# Patient Record
Sex: FEMALE | Race: WHITE | NOT HISPANIC OR LATINO | Employment: OTHER | URBAN - METROPOLITAN AREA
[De-identification: names, ages, dates, MRNs, and addresses within clinical notes are randomized per-mention and may not be internally consistent; named-entity substitution may affect disease eponyms.]

---

## 2017-04-05 ENCOUNTER — CONVERSION ENCOUNTER (OUTPATIENT)
Dept: GENERAL RADIOLOGY | Facility: HOSPITAL | Age: 72
End: 2017-04-05

## 2018-04-11 ENCOUNTER — OFFICE VISIT CONVERTED (OUTPATIENT)
Dept: SURGERY | Facility: CLINIC | Age: 73
End: 2018-04-11
Attending: SURGERY

## 2018-04-25 ENCOUNTER — CONVERSION ENCOUNTER (OUTPATIENT)
Dept: GENERAL RADIOLOGY | Facility: HOSPITAL | Age: 73
End: 2018-04-25

## 2019-07-11 ENCOUNTER — HOSPITAL ENCOUNTER (OUTPATIENT)
Dept: GENERAL RADIOLOGY | Facility: HOSPITAL | Age: 74
Discharge: HOME | End: 2019-07-11

## 2019-12-04 ENCOUNTER — HOSPITAL ENCOUNTER (OUTPATIENT)
Dept: GENERAL RADIOLOGY | Facility: HOSPITAL | Age: 74
Discharge: HOME | End: 2019-12-04

## 2021-02-24 ENCOUNTER — HOSPITAL ENCOUNTER (OUTPATIENT)
Dept: VACCINE CLINIC | Facility: HOSPITAL | Age: 76
Discharge: HOME | End: 2021-02-24
Attending: INTERNAL MEDICINE

## 2021-03-25 ENCOUNTER — HOSPITAL ENCOUNTER (OUTPATIENT)
Dept: VACCINE CLINIC | Facility: HOSPITAL | Age: 76
Discharge: HOME | End: 2021-03-25
Attending: INTERNAL MEDICINE

## 2021-05-16 VITALS — HEIGHT: 61 IN | WEIGHT: 149.37 LBS | BODY MASS INDEX: 28.2 KG/M2 | RESPIRATION RATE: 16 BRPM

## 2021-05-22 ENCOUNTER — TRANSCRIBE ORDERS (OUTPATIENT)
Dept: ADMINISTRATIVE | Facility: HOSPITAL | Age: 76
End: 2021-05-22

## 2021-05-22 DIAGNOSIS — Z12.31 VISIT FOR SCREENING MAMMOGRAM: Primary | ICD-10-CM

## 2021-06-23 ENCOUNTER — HOSPITAL ENCOUNTER (OUTPATIENT)
Dept: MAMMOGRAPHY | Facility: HOSPITAL | Age: 76
Discharge: HOME | End: 2021-06-23
Admitting: NURSE PRACTITIONER

## 2021-06-23 DIAGNOSIS — Z12.31 VISIT FOR SCREENING MAMMOGRAM: ICD-10-CM

## 2021-06-23 PROCEDURE — 77063 BREAST TOMOSYNTHESIS BI: CPT | Performed by: RADIOLOGY

## 2021-06-23 PROCEDURE — 77067 SCR MAMMO BI INCL CAD: CPT

## 2021-06-23 PROCEDURE — 77063 BREAST TOMOSYNTHESIS BI: CPT

## 2021-06-23 PROCEDURE — 77067 SCR MAMMO BI INCL CAD: CPT | Performed by: RADIOLOGY

## 2021-07-07 ENCOUNTER — TRANSCRIBE ORDERS (OUTPATIENT)
Dept: ADMINISTRATIVE | Facility: HOSPITAL | Age: 76
End: 2021-07-07

## 2021-07-07 DIAGNOSIS — R92.8 ABNORMAL MAMMOGRAM: Primary | ICD-10-CM

## 2021-07-29 ENCOUNTER — TRANSCRIBE ORDERS (OUTPATIENT)
Dept: ADMINISTRATIVE | Facility: HOSPITAL | Age: 76
End: 2021-07-29

## 2021-07-29 ENCOUNTER — HOSPITAL ENCOUNTER (OUTPATIENT)
Dept: ULTRASOUND IMAGING | Facility: HOSPITAL | Age: 76
Discharge: HOME | End: 2021-07-29

## 2021-07-29 ENCOUNTER — HOSPITAL ENCOUNTER (OUTPATIENT)
Dept: MAMMOGRAPHY | Facility: HOSPITAL | Age: 76
Discharge: HOME | End: 2021-07-29

## 2021-07-29 DIAGNOSIS — N63.0 BREAST MASS: ICD-10-CM

## 2021-07-29 DIAGNOSIS — R92.1 BREAST CALCIFICATION SEEN ON MAMMOGRAM: ICD-10-CM

## 2021-07-29 DIAGNOSIS — N63.10 BREAST MASS, RIGHT: Primary | ICD-10-CM

## 2021-07-29 DIAGNOSIS — R92.8 ABNORMAL MAMMOGRAM: ICD-10-CM

## 2021-07-29 PROCEDURE — 76642 ULTRASOUND BREAST LIMITED: CPT

## 2021-07-29 PROCEDURE — 77065 DX MAMMO INCL CAD UNI: CPT

## 2021-07-29 PROCEDURE — G0279 TOMOSYNTHESIS, MAMMO: HCPCS

## 2021-08-09 ENCOUNTER — HOSPITAL ENCOUNTER (OUTPATIENT)
Dept: MAMMOGRAPHY | Facility: HOSPITAL | Age: 76
Discharge: HOME | End: 2021-08-09

## 2021-08-09 DIAGNOSIS — N63.10 BREAST MASS, RIGHT: ICD-10-CM

## 2021-08-09 DIAGNOSIS — R92.1 BREAST CALCIFICATION SEEN ON MAMMOGRAM: ICD-10-CM

## 2021-08-09 PROCEDURE — 19000 PUNCTURE ASPIR CYST BREAST: CPT | Performed by: RADIOLOGY

## 2021-08-09 PROCEDURE — 76098 X-RAY EXAM SURGICAL SPECIMEN: CPT | Performed by: RADIOLOGY

## 2021-08-09 PROCEDURE — 77065 DX MAMMO INCL CAD UNI: CPT | Performed by: RADIOLOGY

## 2021-08-09 PROCEDURE — 76942 ECHO GUIDE FOR BIOPSY: CPT

## 2021-08-09 PROCEDURE — 88305 TISSUE EXAM BY PATHOLOGIST: CPT | Performed by: NURSE PRACTITIONER

## 2021-08-09 PROCEDURE — 25010000003 LIDOCAINE 1 % SOLUTION: Performed by: NURSE PRACTITIONER

## 2021-08-09 PROCEDURE — 76098 X-RAY EXAM SURGICAL SPECIMEN: CPT

## 2021-08-09 PROCEDURE — 19081 BX BREAST 1ST LESION STRTCTC: CPT | Performed by: RADIOLOGY

## 2021-08-09 RX ORDER — LIDOCAINE HYDROCHLORIDE 10 MG/ML
10 INJECTION, SOLUTION INFILTRATION; PERINEURAL ONCE
Status: COMPLETED | OUTPATIENT
Start: 2021-08-09 | End: 2021-08-09

## 2021-08-09 RX ORDER — LIDOCAINE HYDROCHLORIDE AND EPINEPHRINE 10; 10 MG/ML; UG/ML
10 INJECTION, SOLUTION INFILTRATION; PERINEURAL ONCE
Status: COMPLETED | OUTPATIENT
Start: 2021-08-09 | End: 2021-08-09

## 2021-08-09 RX ADMIN — LIDOCAINE HYDROCHLORIDE,EPINEPHRINE BITARTRATE 10 ML: 10; .01 INJECTION, SOLUTION INFILTRATION; PERINEURAL at 13:57

## 2021-08-09 RX ADMIN — LIDOCAINE HYDROCHLORIDE 10 ML: 10 INJECTION, SOLUTION INFILTRATION; PERINEURAL at 13:57

## 2021-08-09 RX ADMIN — LIDOCAINE HYDROCHLORIDE 10 ML: 10 INJECTION, SOLUTION INFILTRATION; PERINEURAL at 14:01

## 2021-08-10 LAB
CYTO UR: NORMAL
LAB AP CASE REPORT: NORMAL
LAB AP CLINICAL INFORMATION: NORMAL
PATH REPORT.FINAL DX SPEC: NORMAL
PATH REPORT.GROSS SPEC: NORMAL

## 2021-08-11 ENCOUNTER — TELEPHONE (OUTPATIENT)
Dept: MAMMOGRAPHY | Facility: HOSPITAL | Age: 76
End: 2021-08-11

## 2021-08-11 NOTE — TELEPHONE ENCOUNTER
PT CALLED AND SAID THAT SHE SAW HER RESULTS ON MY CHART AND DID NOT KNOW WHAT IT MEANS. I EXPLAINED WHAT IT SAID, BUT I ADVISED TO WAIT UNTIL THE RADIOLOGIST PLACES THE ADDENDUM ON THE REPORT AND I EXPLAINED WHAT THAT MEANS AND SHE V/U.

## 2021-09-21 ENCOUNTER — TRANSCRIBE ORDERS (OUTPATIENT)
Dept: ADMINISTRATIVE | Facility: HOSPITAL | Age: 76
End: 2021-09-21

## 2021-09-21 DIAGNOSIS — Z78.0 POST-MENOPAUSAL: Primary | ICD-10-CM

## 2022-01-05 ENCOUNTER — HOSPITAL ENCOUNTER (OUTPATIENT)
Dept: BONE DENSITY | Facility: HOSPITAL | Age: 77
Discharge: HOME | End: 2022-01-05
Admitting: NURSE PRACTITIONER

## 2022-01-05 DIAGNOSIS — Z78.0 POST-MENOPAUSAL: ICD-10-CM

## 2022-01-05 PROCEDURE — 77080 DXA BONE DENSITY AXIAL: CPT

## 2023-08-30 ENCOUNTER — TRANSCRIBE ORDERS (OUTPATIENT)
Dept: ADMINISTRATIVE | Facility: HOSPITAL | Age: 78
End: 2023-08-30
Payer: MEDICARE

## 2023-08-30 DIAGNOSIS — Z12.31 VISIT FOR SCREENING MAMMOGRAM: Primary | ICD-10-CM

## 2023-09-20 ENCOUNTER — OFFICE VISIT (OUTPATIENT)
Dept: PODIATRY | Facility: CLINIC | Age: 78
End: 2023-09-20
Payer: MEDICARE

## 2023-09-20 VITALS
WEIGHT: 149 LBS | SYSTOLIC BLOOD PRESSURE: 129 MMHG | HEART RATE: 65 BPM | HEIGHT: 61 IN | TEMPERATURE: 97.3 F | OXYGEN SATURATION: 98 % | DIASTOLIC BLOOD PRESSURE: 62 MMHG | BODY MASS INDEX: 28.13 KG/M2

## 2023-09-20 DIAGNOSIS — M19.171 POST-TRAUMATIC OSTEOARTHRITIS OF RIGHT ANKLE: Primary | ICD-10-CM

## 2023-09-20 DIAGNOSIS — M76.821 POSTERIOR TIBIAL TENDINITIS OF RIGHT LOWER EXTREMITY: ICD-10-CM

## 2023-09-20 RX ORDER — TRIAMCINOLONE ACETONIDE 40 MG/ML
20 INJECTION, SUSPENSION INTRA-ARTICULAR; INTRAMUSCULAR ONCE
Status: COMPLETED | OUTPATIENT
Start: 2023-09-20 | End: 2023-09-20

## 2023-09-20 RX ORDER — BACLOFEN 10 MG/1
10 TABLET ORAL 3 TIMES DAILY PRN
COMMUNITY
Start: 2023-08-25

## 2023-09-20 RX ORDER — BUPIVACAINE HYDROCHLORIDE 5 MG/ML
2 INJECTION, SOLUTION PERINEURAL ONCE
Status: COMPLETED | OUTPATIENT
Start: 2023-09-20 | End: 2023-09-20

## 2023-09-20 RX ORDER — LEVOTHYROXINE SODIUM 0.05 MG/1
50 TABLET ORAL DAILY
COMMUNITY
Start: 2023-08-25

## 2023-09-20 RX ORDER — ERGOCALCIFEROL 1.25 MG/1
50000 CAPSULE ORAL
COMMUNITY
Start: 2023-08-25

## 2023-09-20 RX ORDER — AMLODIPINE BESYLATE 5 MG/1
5 TABLET ORAL DAILY
COMMUNITY
Start: 2023-08-25

## 2023-09-20 RX ORDER — CETIRIZINE HYDROCHLORIDE 10 MG/1
10 TABLET ORAL DAILY
COMMUNITY
Start: 2023-08-25

## 2023-09-20 RX ORDER — RAMIPRIL 10 MG/1
10 CAPSULE ORAL DAILY
COMMUNITY
Start: 2023-08-25

## 2023-09-20 RX ORDER — BRIMONIDINE TARTRATE 2 MG/ML
SOLUTION/ DROPS OPHTHALMIC
COMMUNITY
Start: 2023-09-18

## 2023-09-20 RX ADMIN — TRIAMCINOLONE ACETONIDE 20 MG: 40 INJECTION, SUSPENSION INTRA-ARTICULAR; INTRAMUSCULAR at 09:23

## 2023-09-20 RX ADMIN — BUPIVACAINE HYDROCHLORIDE 2 ML: 5 INJECTION, SOLUTION PERINEURAL at 09:23

## 2023-09-20 NOTE — PROGRESS NOTES
[facility] - PODIATRY    Today's Date: 09/20/23    Patient Name: Genna Cain  MRN: [id number]  CSN: [id number]  PCP: Bella Weinstein APRN,   Referring Provider: Bella Weinstein APRN    SUBJECTIVE     Chief Complaint   Patient presents with    Right Ankle - Pain, Establish Care     Xray on chart, done on 8/25/23  Previous surgery due to fracture in 2012  Has screws and plate   Pain started 4 months ago, aching, in the past 3 weeks pain had increased and could not walk on it  Saw PCP, was advised to wear a boot, pt wore boot for 4 days but back started hurting so pt stopped wearing boot, feels better   Trying to stay off as much as she can      Right Foot - Establish Care, Pain     HPI: Genna Cain, a 78 y.o.female, presents to clinic.    Patient is a 78-year-old female presenting with right ankle pain.  Patient states she had ORIF on her ankle approximately 15 years ago.  Patient states it is tender when she is on her feet for long periods of time she likes to go hiking and walking.  Patient states it mainly bothers her after these activities at the end of the day.  She states she was in a boot and felt better but it started causing issues in the back.    Past Medical History:   Diagnosis Date    Ankle pain, right     Hypertension     Muscle spasm of back     Osteoporosis     Thyroid disease      Past Surgical History:   Procedure Laterality Date    ANKLE OPEN REDUCTION INTERNAL FIXATION      CHOLECYSTECTOMY      HYSTERECTOMY      US GUIDED CYST ASPIRATION BREAST N/A 08/09/2021     Family History   Family history unknown: Yes     Social History     Socioeconomic History    Marital status:    Tobacco Use    Smoking status: Former     Types: Cigarettes    Smokeless tobacco: Never   Vaping Use    Vaping Use: Never used   Substance and Sexual Activity    Alcohol use: Yes     Comment: once a month    Drug use: Never    Sexual activity: Defer     Allergies   Allergen Reactions    Codeine  Unknown - High Severity    Sulfa Antibiotics Unknown - Low Severity     Current Outpatient Medications   Medication Sig Dispense Refill    amLODIPine (NORVASC) 5 MG tablet Take 1 tablet by mouth Daily.      baclofen (LIORESAL) 10 MG tablet Take 1 tablet by mouth 3 (Three) Times a Day As Needed.      brimonidine (ALPHAGAN) 0.2 % ophthalmic solution       CALCIUM PO Take 600 mg by mouth Daily.      cetirizine (zyrTEC) 10 MG tablet Take 1 tablet by mouth Daily.      levothyroxine (SYNTHROID, LEVOTHROID) 50 MCG tablet Take 1 tablet by mouth Daily.      ramipril (ALTACE) 10 MG capsule Take 1 capsule by mouth Daily.      vitamin D (ERGOCALCIFEROL) 1.25 MG (68604 UT) capsule capsule Take 1 capsule by mouth Every 7 (Seven) Days.       No current facility-administered medications for this visit.     Review of Systems   Musculoskeletal:         Right ankle pain     OBJECTIVE     Vitals:    09/20/23 0749   BP: 129/62   Pulse: 65   Temp: 97.3 °F (36.3 °C)   SpO2: 98%       No results found for: WBC, RBC, HGB, HCT, MCV, MCH, MCHC, RDW, RDWSD, MPV, PLT, NEUTRORELPCT, LYMPHORELPCT, MONORELPCT, EOSRELPCT, BASORELPCT, AUTOIGPER, NEUTROABS, LYMPHSABS, MONOSABS, EOSABS, BASOSABS, AUTOIGNUM, NRBC      No results found for: GLUCOSE, BUN, CREATININE, EGFRIFNONA, EGFRIFAFRI, BCR, K, CO2, CALCIUM, PROTENTOTREF, ALBUMIN, LABIL2, BILIRUBIN, AST, ALT    Patient seen in no apparent distress.      PHYSICAL EXAM:     Foot/Ankle Exam    GENERAL  Appearance:  appears stated age  Orientation:  AAOx3  Affect:  appropriate  Gait:  unimpaired  Assistance:  independent  Right shoe gear: casual shoe  Left shoe gear: casual shoe    VASCULAR     Right Foot Vascularity   Normal vascular exam    Dorsalis pedis:  2+  Posterior tibial:  2+  Skin temperature:  warm  Edema grading:  None  CFT:  < 3 seconds  Pedal hair growth:  Present  Varicosities:  none     Left Foot Vascularity   Normal vascular exam    Dorsalis pedis:  2+  Posterior tibial:  2+  Skin  temperature:  warm  Edema grading:  None  CFT:  < 3 seconds  Pedal hair growth:  Present  Varicosities:  none     NEUROLOGIC     Right Foot Neurologic   Normal sensation    Light touch sensation: normal  Vibratory sensation: normal  Hot/Cold sensation: normal  Protective Sensation using [place]-Yrn Monofilament:   Sites intact: 10  Sites tested: 10     Left Foot Neurologic   Normal sensation    Light touch sensation: normal  Vibratory sensation: normal  Hot/Cold sensation:  normal  Protective Sensation using [place]-Yrn Monofilament:   Sites intact: 10  Sites tested: 10    MUSCLE STRENGTH     Right Foot Muscle Strength   Foot dorsiflexion:  4  Foot plantar flexion:  4  Foot inversion:  4  Foot eversion:  4     Left Foot Muscle Strength   Foot dorsiflexion:  4  Foot plantar flexion:  4  Foot inversion:  4  Foot eversion:  4    RANGE OF MOTION     Right Foot Range of Motion   Foot and ankle ROM within normal limits    Ankle dorsiflexion: decreased  with pain  Ankle plantar flexion: decreased  with pain  Foot inversion:  with pain     Left Foot Range of Motion   Foot and ankle ROM within normal limits      DERMATOLOGIC      Right Foot Dermatologic   Skin  Right foot skin is intact.      Left Foot Dermatologic   Skin  Left foot skin is intact.     RADIOLOGY:        XR Tibia/Fibula 2 Views,  RT    Result Date: 2023  Narrative: REVIEWING YOUR TEST RESULTS IN [facility] IS NOT A SUBSTITUTE FOR DISCUSSING THOSE RESULTS WITH YOUR HEALTH CARE PROVIDER. P[facility]TA[facility] YOUR PROVIDER [facility]IA [facility] TO DISCUSS ANY QUESTIONS OR CONCERNS YOU MAY HAVE REGARDING THESE TEST RESULTS.  RADIOLOGY REPORT FACILITY:  [facility] UNIT/AGE/GENDER: P.HL  OP      AGE:77 Y          SEX:F PATIENT NAME/:  MADAY BERUMEN J    1945 UNIT NUMBER:  [id number] ACCOUNT NUMBER:  [id number] ACCESSION NUMBER:  [id number] EXAMINATION: Two views of the chest DATE:2023  HISTORY:Risk factor for lung cancer. Screening exam. COMPARISON: 2022 FINDINGS: Two views of the chest demonstrate no confluent infiltrates or pleural effusions. The heart size is normal. There is no congestive heart failure. IMPRESSION: No acute disease in the chest. RightTibia and Fibula. DATE: 2023 HISTORY:Right ankle and leg   pain. FINDINGS: 2 views were obtained. There is no acute fracture or dislocation. No radio opaque foreign body is seen. Hardware seen fixing a right ankle fracture. IMPRESSION: No acute findings Exam: 3 views right ankle. DATE: 2023. HISTORY: Old injury with ankle fracture surgery. Right ankle pain.   FINDINGS: 3 views demonstrate screw plate fixation device of the distal fibula with 2 screws fixing a medial malleolar fracture. There is no acute fracture or dislocation and no evidence of hardware failure is seen.   IMPRESSION: Post surgical change with no acute findings. Dictated by: Ermias Solis M.D. Images and Report reviewed and interpreted by: Ermias Solis M.D. <PS><Electronically signed by: Ermias Solis M.D.> 2023 1416 D: 2023 1412 T: 2023 1412    XR Chest 2Vw    Result Date: 2023  Narrative: REVIEWING YOUR TEST RESULTS IN [facility] IS NOT A SUBSTITUTE FOR DISCUSSING THOSE RESULTS WITH YOUR HEALTH CARE PROVIDER. PLEASE CONTACT YOUR PROVIDER VIA [facility] TO DISCUSS ANY QUESTIONS OR CONCERNS YOU MAY HAVE REGARDING THESE TEST RESULTS.  RADIOLOGY REPORT FACILITY:  [facility] UNIT/AGE/GENDER: P.HL  OP      AGE:77 Y          SEX:F PATIENT NAME/:  MADAY BERUMEN J    1945 UNIT NUMBER:  [id number] ACCOUNT NUMBER:  [id number] ACCESSION NUMBER:  [id number] EXAMINATION: Two views of the chest DATE:2023 HISTORY:Risk factor for lung cancer. Screening exam. COMPARISON: 2022 FINDINGS: Two views of the chest demonstrate no confluent infiltrates or pleural  effusions. The heart size is normal. There is no congestive heart failure. IMPRESSION: No acute disease in the chest. RightTibia and Fibula. DATE: 2023 HISTORY:Right ankle and leg   pain. FINDINGS: 2 views were obtained. There is no acute fracture or dislocation. No radio opaque foreign body is seen. Hardware seen fixing a right ankle fracture. IMPRESSION: No acute findings Exam: 3 views right ankle. DATE: 2023. HISTORY: Old injury with ankle fracture surgery. Right ankle pain.   FINDINGS: 3 views demonstrate screw plate fixation device of the distal fibula with 2 screws fixing a medial malleolar fracture. There is no acute fracture or dislocation and no evidence of hardware failure is seen.   IMPRESSION: Post surgical change with no acute findings. Dictated by: Ermias Solis M.D. Images and Report reviewed and interpreted by: Ermias Solis M.D. <PS><Electronically signed by: Ermias Solis M.D.> 2023 1416 D: 2023 1412 T: 2023 1412    XR Ankle Min 3 Vws RT    Result Date: 2023  Narrative: REVIEWING YOUR TEST RESULTS IN [facility] IS NOT A SUBSTITUTE FOR DISCUSSING THOSE RESULTS WITH YOUR HEALTH CARE PROVIDER. PLEASE CONTACT YOUR PROVIDER [facility]IA [facility] TO DISCUSS ANY QUESTIONS OR CONCERNS YOU MAY HAVE REGARDING THESE TEST RESULTS.  RADIOLOGY REPORT FACILITY:  [facility] UNIT/AGE/GENDER: P.HL  OP      AGE:77 Y          SEX:F PATIENT NAME/:  MADAY BERUMEN J    1945 UNIT NUMBER:  [id number] ACCOUNT NUMBER:  [id number] ACCESSION NUMBER:  [id number] EXAMINATION: Two views of the chest DATE:2023 HISTORY:Risk factor for lung cancer. Screening exam. COMPARISON: 2022 FINDINGS: Two views of the chest demonstrate no confluent infiltrates or pleural effusions. The heart size is normal. There is no congestive heart failure. IMPRESSION: No acute disease in the chest. RightTibia and Fibula. DATE: 2023  HISTORY:Right ankle and leg   pain. FINDINGS: 2 views were obtained. There is no acute fracture or dislocation. No radio opaque foreign body is seen. Hardware seen fixing a right ankle fracture. IMPRESSION: No acute findings Exam: 3 views right ankle. DATE: 08/25/2023. HISTORY: Old injury with ankle fracture surgery. Right ankle pain.   FINDINGS: 3 views demonstrate screw plate fixation device of the distal fibula with 2 screws fixing a medial malleolar fracture. There is no acute fracture or dislocation and no evidence of hardware failure is seen.   IMPRESSION: Post surgical change with no acute findings. Dictated by: Ermias Solis M.D. Images and Report reviewed and interpreted by: Ermias Solis M.D. <PS><Electronically signed by: Ermias Solis M.D.> 08/25/2023 1416 D: 08/25/2023 1412 T: 08/25/2023 1412     ASSESSMENT/PLAN     Diagnoses and all orders for this visit:    1. Post-traumatic osteoarthritis of right ankle (Primary)    2. Posterior tibial tendinitis of right lower extremity      Brace dispensed    Injection  Date/Time: 09/20/2023  Performed by: Bob Nelson DPM  Authorized by: Bob Nelson DPM     Consent: Verbal consent obtained. Written consent obtained.  Risks and benefits: risks, benefits and alternatives were discussed  Consent given by: patient  Patient identity confirmed: verbally with patient  Indications: pain relief    Betadine prep x 3 to the planned injection site.    Injection site: Right ankle    Sedation:  Patient sedated: no    Patient position: sitting  Needle size: 27 G  Local anesthetic: 1.0 mL 0.25% Marcaine plain and 1.0 mL triamcinolone.   Outcome: pain improved  Patient tolerance: Patient tolerated the procedure well with no immediate complications      Patient to begin stretching exercises and icing in the evening as tolerated. Discussed compression therapy and resting the extremity.  Anti-inflammatory medication to begin taking if okay by  PCP.    Comprehensive lower extremity examination and evaluation was performed.    Discussed findings and treatment plan including risks, benefits, and treatment options with patient in detail. Patient agreed with treatment plan.    Medications and allergies reviewed.  Reviewed available lab values along with other pertinent labs.  These were discussed with the patient.    An After Visit Summary was printed and given to the patient at discharge, including (if requested) any available informative/educational handouts regarding diagnosis, treatment, or medications. All questions were answered to patient/family satisfaction. Should symptoms fail to improve or worsen they agree to call or return to clinic or to go to the Emergency Department. Discussed the importance of following up with any needed screening tests/labs/specialist appointments and any requested follow-up recommended by me today. Importance of maintaining follow-up discussed and patient accepts that missed appointments can delay diagnosis and potentially lead to worsening of conditions.    Return in about 1 week (around 9/27/2023)., or sooner if acute issues arise.    This document has been electronically signed by Bob Nelson DPM on September 20, 2023 08:39 EDT

## 2023-10-18 ENCOUNTER — OFFICE VISIT (OUTPATIENT)
Dept: PODIATRY | Facility: CLINIC | Age: 78
End: 2023-10-18
Payer: MEDICARE

## 2023-10-18 VITALS
SYSTOLIC BLOOD PRESSURE: 139 MMHG | TEMPERATURE: 97.8 F | BODY MASS INDEX: 30.21 KG/M2 | HEIGHT: 61 IN | OXYGEN SATURATION: 95 % | WEIGHT: 160 LBS | HEART RATE: 77 BPM | DIASTOLIC BLOOD PRESSURE: 83 MMHG

## 2023-10-18 DIAGNOSIS — M19.171 POST-TRAUMATIC OSTEOARTHRITIS OF RIGHT ANKLE: Primary | ICD-10-CM

## 2023-10-18 DIAGNOSIS — M76.821 POSTERIOR TIBIAL TENDINITIS OF RIGHT LOWER EXTREMITY: ICD-10-CM

## 2023-10-19 NOTE — PROGRESS NOTES
[facility] - PODIATRY    Today's Date: 10/19/23    Patient Name: Genna Cain  MRN: [id number]  [facility]: [id number]  PCP: Bella Weinstein APRN,   Referring Provider: No ref. provider found    SUBJECTIVE     Chief Complaint   Patient presents with    Right Ankle - Follow-up, Arthritis     Injection follow up   Feels great, wears ankle when walking a lot      HPI: Genna Cain, a 78 y.o.female, presents to clinic.    Patient is a 78-year-old female presenting with right ankle pain.  Patient states she had ORIF on her ankle approximately 15 years ago.  Patient states it is tender when she is on her feet for long periods of time she likes to go hiking and walking.  Patient states it mainly bothers her after these activities at the end of the day.  She states she was in a boot and felt better but it started causing issues in the back.    10/18/2023-patient is doing great since getting the injection.  No new complaints.  Past Medical History:   Diagnosis Date    Ankle pain, right     Hypertension     Muscle spasm of back     Osteoporosis     Post-traumatic osteoarthritis of right ankle     Posterior tibial tendinitis of right lower extremity     Thyroid disease      Past Surgical History:   Procedure Laterality Date    ANKLE OPEN REDUCTION INTERNAL FIXATION      CHOLECYSTECTOMY      HYSTERECTOMY      US GUIDED CYST ASPIRATION BREAST N/A 08/09/2021     Family History   Family history unknown: Yes     Social History     Socioeconomic History    Marital status:    Tobacco Use    Smoking status: Former     Types: Cigarettes    Smokeless tobacco: Never   Vaping Use    Vaping Use: Never used   Substance and Sexual Activity    Alcohol use: Yes     Comment: once a month    Drug use: Never    Sexual activity: Defer     Allergies   Allergen Reactions    Codeine Unknown - High Severity    Sulfa Antibiotics Unknown - Low Severity     Current Outpatient Medications   Medication Sig Dispense Refill     "amLODIPine (NORVASC) 5 MG tablet Take 1 tablet by mouth Daily.      baclofen (LIORESAL) 10 MG tablet Take 1 tablet by mouth 3 (Three) Times a Day As Needed.      brimonidine (ALPHAGAN) 0.2 % ophthalmic solution       CALCIUM PO Take 600 mg by mouth Daily.      cetirizine (zyrTEC) 10 MG tablet Take 1 tablet by mouth Daily.      levothyroxine (SYNTHROID, LEVOTHROID) 50 MCG tablet Take 1 tablet by mouth Daily.      ramipril (ALTACE) 10 MG capsule Take 1 capsule by mouth Daily.      vitamin D (ERGOCALCIFEROL) 1.25 MG (46975 UT) capsule capsule Take 1 capsule by mouth Every 7 (Seven) Days.       No current facility-administered medications for this visit.     Review of Systems   Musculoskeletal:         Right ankle pain       OBJECTIVE     Vitals:    10/18/23 0945   BP: 139/83   Pulse: 77   Temp: 97.8 °F (36.6 °C)   SpO2: 95%       No results found for: \"WBC\", \"RBC\", \"HGB\", \"HCT\", \"MCV\", \"MCH\", \"MCHC\", \"RDW\", \"RDWSD\", \"MPV\", \"PLT\", \"NEUTRORELPCT\", \"LYMPHORELPCT\", \"MONORELPCT\", \"EOSRELPCT\", \"BASORELPCT\", \"AUTOIGPER\", \"NEUTROABS\", \"LYMPHSABS\", \"MONOSABS\", \"EOSABS\", \"BASOSABS\", \"AUTOIGNUM\", \"NRBC\"      No results found for: \"GLUCOSE\", \"BUN\", \"CREATININE\", \"EGFRIFNONA\", \"EGFRIFAFRI\", \"BCR\", \"K\", \"CO2\", \"CALCIUM\", \"PROTENTOTREF\", \"ALBUMIN\", \"LABIL2\", \"BILIRUBIN\", \"AST\", \"ALT\"    Patient seen in no apparent distress.      PHYSICAL EXAM:     Foot/Ankle Exam    GENERAL  Appearance:  appears stated age  Orientation:  AAOx3  Affect:  appropriate  Gait:  unimpaired  Assistance:  independent  Right shoe gear: casual shoe  Left shoe gear: casual shoe    VASCULAR     Right Foot Vascularity   Normal vascular exam    Dorsalis pedis:  2+  Posterior tibial:  2+  Skin temperature:  warm  Edema grading:  None  CFT:  < 3 seconds  Pedal hair growth:  Present  Varicosities:  none     Left Foot Vascularity   Normal vascular exam    Dorsalis pedis:  2+  Posterior tibial:  2+  Skin temperature:  warm  Edema grading:  None  CFT:  < 3 " seconds  Pedal hair growth:  Present  Varicosities:  none     NEUROLOGIC     Right Foot Neurologic   Normal sensation    Light touch sensation: normal  Vibratory sensation: normal  Hot/Cold sensation: normal  Protective Sensation using [place]-Yrn Monofilament:   Sites intact: 10  Sites tested: 10     Left Foot Neurologic   Normal sensation    Light touch sensation: normal  Vibratory sensation: normal  Hot/Cold sensation:  normal  Protective Sensation using [place]-Yrn Monofilament:   Sites intact: 10  Sites tested: 10    MUSCLE STRENGTH     Right Foot Muscle Strength   Foot dorsiflexion:  4  Foot plantar flexion:  4  Foot inversion:  4  Foot eversion:  4     Left Foot Muscle Strength   Foot dorsiflexion:  4  Foot plantar flexion:  4  Foot inversion:  4  Foot eversion:  4    RANGE OF MOTION     Right Foot Range of Motion   Foot and ankle ROM within normal limits    Ankle dorsiflexion: decreased  with pain  Ankle plantar flexion: decreased  with pain  Foot inversion:  with pain     Left Foot Range of Motion   Foot and ankle ROM within normal limits      DERMATOLOGIC      Right Foot Dermatologic   Skin  Right foot skin is intact.      Left Foot Dermatologic   Skin  Left foot skin is intact.       RADIOLOGY:        XR Ankle 3+ View Right    Result Date: 9/21/2023  Narrative: IN-OFFICE IMAGING:   Standing, weightbearing, 3 view, AP, MO, Lateral, right ankle Indication: Pain Findings: 3 views of the right ankle demonstrate no acute fracture dislocations.  Hardware intact to the fibula and medial malleolus.  No signs of loosening of the hardware.  Normal bone density.  Mild degenerative changes tibiotalar joint. Comparison: No     ASSESSMENT/PLAN     Diagnoses and all orders for this visit:    1. Post-traumatic osteoarthritis of right ankle (Primary)    2. Posterior tibial tendinitis of right lower extremity        Patient to begin continue Exercises and icing in the evening as tolerated. Discussed compression  therapy and resting the extremity.  Anti-inflammatory medication to begin taking if okay by PCP.    Return to clinic as needed    Comprehensive lower extremity examination and evaluation was performed.    Discussed findings and treatment plan including risks, benefits, and treatment options with patient in detail. Patient agreed with treatment plan.    Medications and allergies reviewed.  Reviewed available lab values along with other pertinent labs.  These were discussed with the patient.    An After Visit Summary was printed and given to the patient at discharge, including (if requested) any available informative/educational handouts regarding diagnosis, treatment, or medications. All questions were answered to patient/family satisfaction. Should symptoms fail to improve or worsen they agree to call or return to clinic or to go to the Emergency Department. Discussed the importance of following up with any needed screening tests/labs/specialist appointments and any requested follow-up recommended by me today. Importance of maintaining follow-up discussed and patient accepts that missed appointments can delay diagnosis and potentially lead to worsening of conditions.    No follow-ups on file., or sooner if acute issues arise.    This document has been electronically signed by Bob Nelson DPM on October 19, 2023 10:59 EDT

## 2023-12-13 ENCOUNTER — HOSPITAL ENCOUNTER (OUTPATIENT)
Dept: MAMMOGRAPHY | Facility: HOSPITAL | Age: 78
Discharge: HOME | End: 2023-12-13
Admitting: NURSE PRACTITIONER
Payer: MEDICARE

## 2023-12-13 DIAGNOSIS — Z12.31 VISIT FOR SCREENING MAMMOGRAM: ICD-10-CM

## 2023-12-13 PROCEDURE — 77063 BREAST TOMOSYNTHESIS BI: CPT

## 2023-12-13 PROCEDURE — 77067 SCR MAMMO BI INCL CAD: CPT

## 2024-10-28 ENCOUNTER — APPOINTMENT (OUTPATIENT)
Dept: CT IMAGING | Facility: HOSPITAL | Age: 79
End: 2024-10-28
Payer: MEDICARE

## 2024-10-28 ENCOUNTER — APPOINTMENT (OUTPATIENT)
Dept: GENERAL RADIOLOGY | Facility: HOSPITAL | Age: 79
End: 2024-10-28
Payer: MEDICARE

## 2024-10-28 ENCOUNTER — HOSPITAL ENCOUNTER (EMERGENCY)
Facility: HOSPITAL | Age: 79
Discharge: HOME | End: 2024-10-28
Attending: EMERGENCY MEDICINE | Admitting: EMERGENCY MEDICINE
Payer: MEDICARE

## 2024-10-28 VITALS
BODY MASS INDEX: 28.67 KG/M2 | HEART RATE: 68 BPM | OXYGEN SATURATION: 92 % | HEIGHT: 63 IN | RESPIRATION RATE: 17 BRPM | DIASTOLIC BLOOD PRESSURE: 72 MMHG | SYSTOLIC BLOOD PRESSURE: 120 MMHG | WEIGHT: 161.82 LBS | TEMPERATURE: 97.9 F

## 2024-10-28 DIAGNOSIS — R07.89 CHEST PRESSURE: ICD-10-CM

## 2024-10-28 DIAGNOSIS — R42 LIGHTHEADEDNESS: Primary | ICD-10-CM

## 2024-10-28 LAB
ALBUMIN SERPL-MCNC: 4.1 G/DL (ref 3.5–5.2)
ALBUMIN/GLOB SERPL: 1.1 G/DL
ALP SERPL-CCNC: 78 U/L (ref 39–117)
ALT SERPL W P-5'-P-CCNC: 12 U/L (ref 1–33)
ANION GAP SERPL CALCULATED.3IONS-SCNC: 11.8 MMOL/L (ref 5–15)
AST SERPL-CCNC: 17 U/L (ref 1–32)
BASOPHILS # BLD AUTO: 0.05 10*3/MM3 (ref 0–0.2)
BASOPHILS NFR BLD AUTO: 0.7 % (ref 0–1.5)
BILIRUB SERPL-MCNC: 0.4 MG/DL (ref 0–1.2)
BUN SERPL-MCNC: 16 MG/DL (ref 8–23)
BUN/CREAT SERPL: 21.9 (ref 7–25)
CALCIUM SPEC-SCNC: 9.3 MG/DL (ref 8.6–10.5)
CHLORIDE SERPL-SCNC: 101 MMOL/L (ref 98–107)
CO2 SERPL-SCNC: 26.2 MMOL/L (ref 22–29)
CREAT SERPL-MCNC: 0.73 MG/DL (ref 0.57–1)
DEPRECATED RDW RBC AUTO: 43.7 FL (ref 37–54)
EGFRCR SERPLBLD CKD-EPI 2021: 84.8 ML/MIN/1.73
EOSINOPHIL # BLD AUTO: 0.11 10*3/MM3 (ref 0–0.4)
EOSINOPHIL NFR BLD AUTO: 1.6 % (ref 0.3–6.2)
ERYTHROCYTE [DISTWIDTH] IN BLOOD BY AUTOMATED COUNT: 12.7 % (ref 12.3–15.4)
GEN 5 2HR TROPONIN T REFLEX: <6 NG/L
GLOBULIN UR ELPH-MCNC: 3.6 GM/DL
GLUCOSE SERPL-MCNC: 99 MG/DL (ref 65–99)
HCT VFR BLD AUTO: 41.6 % (ref 34–46.6)
HGB BLD-MCNC: 13.4 G/DL (ref 12–15.9)
HOLD SPECIMEN: NORMAL
HOLD SPECIMEN: NORMAL
IMM GRANULOCYTES # BLD AUTO: 0.02 10*3/MM3 (ref 0–0.05)
IMM GRANULOCYTES NFR BLD AUTO: 0.3 % (ref 0–0.5)
LIPASE SERPL-CCNC: 15 U/L (ref 13–60)
LYMPHOCYTES # BLD AUTO: 1.97 10*3/MM3 (ref 0.7–3.1)
LYMPHOCYTES NFR BLD AUTO: 28.3 % (ref 19.6–45.3)
MAGNESIUM SERPL-MCNC: 2.2 MG/DL (ref 1.6–2.4)
MCH RBC QN AUTO: 30.1 PG (ref 26.6–33)
MCHC RBC AUTO-ENTMCNC: 32.2 G/DL (ref 31.5–35.7)
MCV RBC AUTO: 93.5 FL (ref 79–97)
MONOCYTES # BLD AUTO: 0.41 10*3/MM3 (ref 0.1–0.9)
MONOCYTES NFR BLD AUTO: 5.9 % (ref 5–12)
NEUTROPHILS NFR BLD AUTO: 4.41 10*3/MM3 (ref 1.7–7)
NEUTROPHILS NFR BLD AUTO: 63.2 % (ref 42.7–76)
NRBC BLD AUTO-RTO: 0 /100 WBC (ref 0–0.2)
NT-PROBNP SERPL-MCNC: 88.1 PG/ML (ref 0–1800)
PLATELET # BLD AUTO: 361 10*3/MM3 (ref 140–450)
PMV BLD AUTO: 10.2 FL (ref 6–12)
POTASSIUM SERPL-SCNC: 4.1 MMOL/L (ref 3.5–5.2)
PROT SERPL-MCNC: 7.7 G/DL (ref 6–8.5)
RBC # BLD AUTO: 4.45 10*6/MM3 (ref 3.77–5.28)
SODIUM SERPL-SCNC: 139 MMOL/L (ref 136–145)
TROPONIN T DELTA: NORMAL
TROPONIN T SERPL HS-MCNC: <6 NG/L
WBC NRBC COR # BLD AUTO: 6.97 10*3/MM3 (ref 3.4–10.8)
WHOLE BLOOD HOLD COAG: NORMAL
WHOLE BLOOD HOLD SPECIMEN: NORMAL

## 2024-10-28 PROCEDURE — 83880 ASSAY OF NATRIURETIC PEPTIDE: CPT

## 2024-10-28 PROCEDURE — 36415 COLL VENOUS BLD VENIPUNCTURE: CPT

## 2024-10-28 PROCEDURE — 71260 CT THORAX DX C+: CPT

## 2024-10-28 PROCEDURE — 93005 ELECTROCARDIOGRAM TRACING: CPT | Performed by: EMERGENCY MEDICINE

## 2024-10-28 PROCEDURE — 84484 ASSAY OF TROPONIN QUANT: CPT

## 2024-10-28 PROCEDURE — 71045 X-RAY EXAM CHEST 1 VIEW: CPT

## 2024-10-28 PROCEDURE — 85025 COMPLETE CBC W/AUTO DIFF WBC: CPT

## 2024-10-28 PROCEDURE — 80053 COMPREHEN METABOLIC PANEL: CPT

## 2024-10-28 PROCEDURE — 99285 EMERGENCY DEPT VISIT HI MDM: CPT

## 2024-10-28 PROCEDURE — 93005 ELECTROCARDIOGRAM TRACING: CPT

## 2024-10-28 PROCEDURE — 84484 ASSAY OF TROPONIN QUANT: CPT | Performed by: EMERGENCY MEDICINE

## 2024-10-28 PROCEDURE — 83735 ASSAY OF MAGNESIUM: CPT

## 2024-10-28 PROCEDURE — 83690 ASSAY OF LIPASE: CPT

## 2024-10-28 PROCEDURE — 25510000001 IOPAMIDOL PER 1 ML: Performed by: EMERGENCY MEDICINE

## 2024-10-28 RX ORDER — IOPAMIDOL 755 MG/ML
100 INJECTION, SOLUTION INTRAVASCULAR
Status: COMPLETED | OUTPATIENT
Start: 2024-10-28 | End: 2024-10-28

## 2024-10-28 RX ORDER — ASPIRIN 81 MG/1
324 TABLET, CHEWABLE ORAL ONCE
Status: DISCONTINUED | OUTPATIENT
Start: 2024-10-28 | End: 2024-10-28 | Stop reason: HOSPADM

## 2024-10-28 RX ORDER — SODIUM CHLORIDE 0.9 % (FLUSH) 0.9 %
10 SYRINGE (ML) INJECTION AS NEEDED
Status: DISCONTINUED | OUTPATIENT
Start: 2024-10-28 | End: 2024-10-28 | Stop reason: HOSPADM

## 2024-10-28 RX ADMIN — IOPAMIDOL 100 ML: 755 INJECTION, SOLUTION INTRAVENOUS at 14:22

## 2024-10-29 LAB
QT INTERVAL: 395 MS
QT INTERVAL: 421 MS
QTC INTERVAL: 426 MS
QTC INTERVAL: 430 MS

## 2024-11-07 ENCOUNTER — OFFICE VISIT (OUTPATIENT)
Dept: CARDIOLOGY | Facility: CLINIC | Age: 79
End: 2024-11-07
Payer: MEDICARE

## 2024-11-07 VITALS
DIASTOLIC BLOOD PRESSURE: 69 MMHG | SYSTOLIC BLOOD PRESSURE: 135 MMHG | BODY MASS INDEX: 28.53 KG/M2 | HEART RATE: 73 BPM | WEIGHT: 161 LBS | HEIGHT: 63 IN

## 2024-11-07 DIAGNOSIS — R07.9 CHEST PAIN, UNSPECIFIED TYPE: Primary | ICD-10-CM

## 2024-11-07 DIAGNOSIS — R00.2 PALPITATIONS: ICD-10-CM

## 2024-11-07 DIAGNOSIS — I10 ESSENTIAL HYPERTENSION: ICD-10-CM

## 2024-11-07 PROBLEM — M81.0 OSTEOPOROSIS: Status: ACTIVE | Noted: 2024-11-07

## 2024-11-07 PROBLEM — E55.9 VITAMIN D DEFICIENCY: Status: ACTIVE | Noted: 2024-11-07

## 2024-11-07 PROBLEM — E03.9 HYPOTHYROIDISM: Status: ACTIVE | Noted: 2024-11-07

## 2024-11-07 PROCEDURE — 99204 OFFICE O/P NEW MOD 45 MIN: CPT | Performed by: INTERNAL MEDICINE

## 2024-11-07 PROCEDURE — 1159F MED LIST DOCD IN RCRD: CPT | Performed by: INTERNAL MEDICINE

## 2024-11-07 PROCEDURE — 1160F RVW MEDS BY RX/DR IN RCRD: CPT | Performed by: INTERNAL MEDICINE

## 2024-11-07 PROCEDURE — 3075F SYST BP GE 130 - 139MM HG: CPT | Performed by: INTERNAL MEDICINE

## 2024-11-07 PROCEDURE — 3078F DIAST BP <80 MM HG: CPT | Performed by: INTERNAL MEDICINE

## 2024-11-09 PROBLEM — R00.2 PALPITATIONS: Status: ACTIVE | Noted: 2024-11-09

## 2024-11-09 PROBLEM — R07.9 CHEST PAIN: Status: ACTIVE | Noted: 2024-11-09

## 2024-11-09 NOTE — ASSESSMENT & PLAN NOTE
Blood pressure well-controlled in the office today.  Continue amlodipine and ramipril at the current dose.

## 2024-11-09 NOTE — ASSESSMENT & PLAN NOTE
1 episode of chest pressure and shortness of breath while shopping in [employer].  Symptoms eventually subsided.  Symptoms are somewhat atypical for angina.  However she has multiple risk factors of coronary artery disease and no recent ischemic evaluation available.  Will proceed with an echocardiogram to assess LV function and valvular function.  A SPECT stress test were performed to rule out reversible ischemia.

## 2024-11-09 NOTE — ASSESSMENT & PLAN NOTE
She reports remittent palpitations.  Will proceed with a 24-hour Holter monitor study to rule out significant arrhythmias.

## 2024-11-09 NOTE — PROGRESS NOTES
CARDIOLOGY INITIAL CONSULT       Chief Complaint  Establish Care and Chest Pain    Subjective            Genna Cain presents to [facility] CARDIOLOGY  History of Present Illness    This is a 79-year-old female with hypertension, hypothyroidism, who was referred to cardiac evaluation because of chest pressure and palpitations.  Per patient, she had an episode while shopping in the [employer].  She suddenly felt a pressure in the chest and could not take deep breaths.  The whole episode lasted for almost 5 minutes and eventually subsided.  She went home afterwards.  For the next 2 days she felt very weak and tired.  She was seen PCP office the third day, her blood pressure was noted to be on the higher side.  She also reported having some lightheadedness.  She was advised to go to the ER because of this history.    Workup done in the ER was unremarkable acute coronary syndrome ruled out.  Her blood pressure was normal.  She was discharged home without any medication changes.  She currently reports no chest pain.  She gets intermittent episodes of palpitations.  Denies shortness of breath.    Past History:    Medical History:  Past Medical History:   Diagnosis Date    Ankle pain, right     Hypertension     Muscle spasm of back     Osteoporosis     Post-traumatic osteoarthritis of right ankle     Posterior tibial tendinitis of right lower extremity     Thyroid disease        Family History: Reviewed, family history is positive for coronary artery disease    Social History: reports that she has quit smoking. Her smoking use included cigarettes. She has never used smokeless tobacco. She reports current alcohol use. She reports that she does not use drugs.    Allergies: Codeine and Sulfa antibiotics    Current Outpatient Medications on File Prior to Visit   Medication Sig    amLODIPine (NORVASC) 5 MG tablet Take 1 tablet by mouth Daily.    baclofen (LIORESAL) 10 MG tablet Take 1 tablet by mouth 3  "(Three) Times a Day As Needed.    brimonidine (ALPHAGAN) 0.2 % ophthalmic solution     CALCIUM PO Take 600 mg by mouth Daily.    cetirizine (zyrTEC) 10 MG tablet Take 1 tablet by mouth Daily.    levothyroxine (SYNTHROID, LEVOTHROID) 50 MCG tablet Take 1 tablet by mouth Daily.    ramipril (ALTACE) 10 MG capsule Take 1 capsule by mouth Daily.    vitamin D (ERGOCALCIFEROL) 1.25 MG (78158 UT) capsule capsule Take 1 capsule by mouth Every 7 (Seven) Days.     No current facility-administered medications on file prior to visit.          Review of Systems   Constitutional:  Negative for fatigue, unexpected weight gain and unexpected weight loss.   Eyes:  Negative for double vision.   Respiratory:  Negative for cough and wheezing.    Cardiovascular:  Positive for chest pain and palpitations. Negative for leg swelling.   Gastrointestinal:  Negative for abdominal pain, nausea and vomiting.   Endocrine: Negative for cold intolerance, heat intolerance, polydipsia and polyuria.   Musculoskeletal:  Negative for arthralgias and back pain.   Skin:  Negative for color change.   Neurological:  Negative for dizziness, syncope, weakness and headache.   Hematological:  Does not bruise/bleed easily.        Objective     /69   Pulse 73   Ht 160 cm (63\")   Wt 73 kg (161 lb)   BMI 28.52 kg/m²       Physical Exam  Constitutional:       General: She is awake. She is not in acute distress.     Appearance: Normal appearance.   Eyes:      Extraocular Movements: Extraocular movements intact.      Pupils: Pupils are equal, round, and reactive to light.   Neck:      Thyroid: No thyromegaly.      Vascular: No carotid bruit or JVD.   Cardiovascular:      Rate and Rhythm: Normal rate and regular rhythm.      Chest Wall: PMI is not displaced.      Heart sounds: Normal heart sounds, S1 normal and S2 normal. No murmur heard.     No friction rub. No gallop. No S3 or S4 sounds.   Pulmonary:      Effort: Pulmonary effort is normal. No respiratory " distress.      Breath sounds: Normal breath sounds. No wheezing, rhonchi or rales.   Abdominal:      General: Bowel sounds are normal.      Palpations: Abdomen is soft.      Tenderness: There is no abdominal tenderness.   Musculoskeletal:      Cervical back: Neck supple.      Right lower leg: No edema.      Left lower leg: No edema.   Skin:     Nails: There is no clubbing.   Neurological:      General: No focal deficit present.      Mental Status: She is alert and oriented to person, place, and time.           Result Review :     The following data was reviewed by: Kevin Avila MD on 11/07/2024:    CMP          10/28/2024    11:55   CMP   Glucose 99    BUN 16    Creatinine 0.73    EGFR 84.8    Sodium 139    Potassium 4.1    Chloride 101    Calcium 9.3    Total Protein 7.7    Albumin 4.1    Globulin 3.6    Total Bilirubin 0.4    Alkaline Phosphatase 78    AST (SGOT) 17    ALT (SGPT) 12    Albumin/Globulin Ratio 1.1    BUN/Creatinine Ratio 21.9    Anion Gap 11.8      CBC          8/20/2024    09:34 10/28/2024    11:55   CBC   WBC 7.52     6.97    RBC 4.36     4.45    Hemoglobin 13.5     13.4    Hematocrit 41.5     41.6    MCV 95.2     93.5    MCH 31.0     30.1    MCHC 32.5     32.2    RDW 12.1     12.7    Platelets 319     361       Details          This result is from an external source.                    Data reviewed: Cardiology studies        EKG done in the emergency room on 10/28/2024 showed sinus rhythm, normal axis, no ischemic changes, normal ECG             Assessment and Plan        Diagnoses and all orders for this visit:    1. Chest pain, unspecified type (Primary)  Assessment & Plan:  1 episode of chest pressure and shortness of breath while shopping in [employer].  Symptoms eventually subsided.  Symptoms are somewhat atypical for angina.  However she has multiple risk factors of coronary artery disease and no recent ischemic evaluation available.  Will proceed with an echocardiogram to assess LV  function and valvular function.  A SPECT stress test were performed to rule out reversible ischemia.    Orders:  -     Adult Transthoracic Echo Complete W/ Cont if Necessary Per Protocol; Future  -     Stress Test With Myocardial Perfusion One Day; Future    2. Palpitations  Assessment & Plan:  She reports remittent palpitations.  Will proceed with a 24-hour Holter monitor study to rule out significant arrhythmias.    Orders:  -     Holter Monitor - 24 Hour; Future    3. Essential hypertension  Assessment & Plan:  Blood pressure well-controlled in the office today.  Continue amlodipine and ramipril at the current dose.            I spent 18 minutes caring for Genna on this date of service. This time includes time spent by me in the following activities:reviewing tests, obtaining and/or reviewing a separately obtained history, performing a medically appropriate examination and/or evaluation , ordering medications, tests, or procedures, and documenting information in the medical record    Follow Up     Return for Will schedule f/u after reviewing test results.    Patient was given instructions and counseling regarding her condition or for health maintenance advice. Please see specific information pulled into the AVS if appropriate.

## 2024-12-11 ENCOUNTER — HOSPITAL ENCOUNTER (OUTPATIENT)
Dept: CARDIOLOGY | Facility: HOSPITAL | Age: 79
Discharge: HOME | End: 2024-12-11
Admitting: INTERNAL MEDICINE
Payer: MEDICARE

## 2024-12-11 ENCOUNTER — HOSPITAL ENCOUNTER (OUTPATIENT)
Dept: NUCLEAR MEDICINE | Facility: HOSPITAL | Age: 79
Discharge: HOME | End: 2024-12-11
Payer: MEDICARE

## 2024-12-11 DIAGNOSIS — R07.9 CHEST PAIN, UNSPECIFIED TYPE: ICD-10-CM

## 2024-12-11 PROCEDURE — A9502 TC99M TETROFOSMIN: HCPCS | Performed by: INTERNAL MEDICINE

## 2024-12-11 PROCEDURE — 93306 TTE W/DOPPLER COMPLETE: CPT

## 2024-12-11 PROCEDURE — 93017 CV STRESS TEST TRACING ONLY: CPT

## 2024-12-11 PROCEDURE — 78452 HT MUSCLE IMAGE SPECT MULT: CPT

## 2024-12-11 PROCEDURE — 34310000005 TECHNETIUM TETROFOSMIN KIT: Performed by: INTERNAL MEDICINE

## 2024-12-11 RX ADMIN — TETROFOSMIN 1 DOSE: 1.38 INJECTION, POWDER, LYOPHILIZED, FOR SOLUTION INTRAVENOUS at 09:54

## 2024-12-11 RX ADMIN — TETROFOSMIN 1 DOSE: 1.38 INJECTION, POWDER, LYOPHILIZED, FOR SOLUTION INTRAVENOUS at 11:29

## 2024-12-12 ENCOUNTER — TELEPHONE (OUTPATIENT)
Dept: CARDIOLOGY | Facility: CLINIC | Age: 79
End: 2024-12-12
Payer: MEDICARE

## 2024-12-12 LAB
AORTIC DIMENSIONLESS INDEX: 0.79 (DI)
ASCENDING AORTA: 3 CM
BH CV ECHO MEAS - ACS: 1.5 CM
BH CV ECHO MEAS - AO MAX PG: 7.5 MMHG
BH CV ECHO MEAS - AO MEAN PG: 4 MMHG
BH CV ECHO MEAS - AO ROOT DIAM: 2.6 CM
BH CV ECHO MEAS - AO V2 MAX: 137 CM/SEC
BH CV ECHO MEAS - AO V2 VTI: 25.6 CM
BH CV ECHO MEAS - AVA(I,D): 2.49 CM2
BH CV ECHO MEAS - EDV(CUBED): 68.9 ML
BH CV ECHO MEAS - EDV(MOD-SP2): 61.1 ML
BH CV ECHO MEAS - EDV(MOD-SP4): 71.6 ML
BH CV ECHO MEAS - EF(MOD-BP): 65.4 %
BH CV ECHO MEAS - EF(MOD-SP2): 63.8 %
BH CV ECHO MEAS - EF(MOD-SP4): 65.1 %
BH CV ECHO MEAS - ESV(CUBED): 17.6 ML
BH CV ECHO MEAS - ESV(MOD-SP2): 22.1 ML
BH CV ECHO MEAS - ESV(MOD-SP4): 25 ML
BH CV ECHO MEAS - FS: 36.6 %
BH CV ECHO MEAS - IVS/LVPW: 1.29 CM
BH CV ECHO MEAS - IVSD: 0.9 CM
BH CV ECHO MEAS - LA DIMENSION: 3.6 CM
BH CV ECHO MEAS - LAT PEAK E' VEL: 6.5 CM/SEC
BH CV ECHO MEAS - LV MASS(C)D: 97.3 GRAMS
BH CV ECHO MEAS - LV MAX PG: 4.5 MMHG
BH CV ECHO MEAS - LV MEAN PG: 2 MMHG
BH CV ECHO MEAS - LV V1 MAX: 106 CM/SEC
BH CV ECHO MEAS - LV V1 VTI: 20.3 CM
BH CV ECHO MEAS - LVIDD: 4.1 CM
BH CV ECHO MEAS - LVIDS: 2.6 CM
BH CV ECHO MEAS - LVOT AREA: 3.1 CM2
BH CV ECHO MEAS - LVOT DIAM: 2 CM
BH CV ECHO MEAS - LVPWD: 0.7 CM
BH CV ECHO MEAS - MED PEAK E' VEL: 5.8 CM/SEC
BH CV ECHO MEAS - MV A MAX VEL: 89.1 CM/SEC
BH CV ECHO MEAS - MV DEC SLOPE: 431 CM/SEC2
BH CV ECHO MEAS - MV DEC TIME: 0.2 SEC
BH CV ECHO MEAS - MV E MAX VEL: 60.3 CM/SEC
BH CV ECHO MEAS - MV E/A: 0.68
BH CV ECHO MEAS - MV MEAN PG: 3 MMHG
BH CV ECHO MEAS - MV P1/2T: 56.2 MSEC
BH CV ECHO MEAS - MV V2 VTI: 22.3 CM
BH CV ECHO MEAS - MVA(P1/2T): 3.9 CM2
BH CV ECHO MEAS - MVA(VTI): 2.9 CM2
BH CV ECHO MEAS - PA V2 MAX: 117 CM/SEC
BH CV ECHO MEAS - PULM A REVS DUR: 0.15 SEC
BH CV ECHO MEAS - PULM A REVS VEL: 32.1 CM/SEC
BH CV ECHO MEAS - PULM DIAS VEL: 31.6 CM/SEC
BH CV ECHO MEAS - PULM S/D: 1.41
BH CV ECHO MEAS - PULM SYS VEL: 44.6 CM/SEC
BH CV ECHO MEAS - QP/QS: 0.52
BH CV ECHO MEAS - RV MAX PG: 3.3 MMHG
BH CV ECHO MEAS - RV V1 MAX: 90.2 CM/SEC
BH CV ECHO MEAS - RV V1 VTI: 14.7 CM
BH CV ECHO MEAS - RVDD: 2.9 CM
BH CV ECHO MEAS - RVOT DIAM: 1.7 CM
BH CV ECHO MEAS - SV(LVOT): 63.8 ML
BH CV ECHO MEAS - SV(MOD-SP2): 39 ML
BH CV ECHO MEAS - SV(MOD-SP4): 46.6 ML
BH CV ECHO MEAS - SV(RVOT): 33.4 ML
BH CV ECHO MEASUREMENTS AVERAGE E/E' RATIO: 9.8
BH CV IMMEDIATE POST TECH DATA BLOOD PRESSURE: NORMAL MMHG
BH CV IMMEDIATE POST TECH DATA HEART RATE: 124 BPM
BH CV IMMEDIATE POST TECH DATA OXYGEN SATS: 97 %
BH CV REST NUCLEAR ISOTOPE DOSE: 8.5 MCI
BH CV SIX MINUTE RECOVERY TECH DATA BLOOD PRESSURE: NORMAL
BH CV SIX MINUTE RECOVERY TECH DATA HEART RATE: 94 BPM
BH CV SIX MINUTE RECOVERY TECH DATA OXYGEN SATURATION: 96 %
BH CV STRESS BP STAGE 1: NORMAL
BH CV STRESS BP STAGE 2: NORMAL
BH CV STRESS DURATION MIN STAGE 1: 3
BH CV STRESS DURATION MIN STAGE 2: 3
BH CV STRESS DURATION MIN STAGE 3: 2
BH CV STRESS DURATION SEC STAGE 1: 0
BH CV STRESS DURATION SEC STAGE 2: 0
BH CV STRESS DURATION SEC STAGE 3: 0
BH CV STRESS GRADE STAGE 1: 0
BH CV STRESS GRADE STAGE 2: 5
BH CV STRESS GRADE STAGE 3: 7
BH CV STRESS HR STAGE 1: 108
BH CV STRESS HR STAGE 2: 117
BH CV STRESS HR STAGE 3: 124
BH CV STRESS METS STAGE 1: 2.3
BH CV STRESS METS STAGE 2: 3.5
BH CV STRESS METS STAGE 3: 4.6
BH CV STRESS NUCLEAR ISOTOPE DOSE: 31.6 MCI
BH CV STRESS O2 STAGE 1: 93
BH CV STRESS O2 STAGE 2: 95
BH CV STRESS O2 STAGE 3: 91
BH CV STRESS PROTOCOL 1: NORMAL
BH CV STRESS RECOVERY BP: NORMAL MMHG
BH CV STRESS RECOVERY HR: 94 BPM
BH CV STRESS RECOVERY O2: 96 %
BH CV STRESS SPEED STAGE 1: 1.7
BH CV STRESS SPEED STAGE 2: 1.7
BH CV STRESS SPEED STAGE 3: 2.3
BH CV STRESS STAGE 1: 1
BH CV STRESS STAGE 2: 2
BH CV STRESS STAGE 3: 3
BH CV THREE MINUTE POST TECH DATA BLOOD PRESSURE: NORMAL MMHG
BH CV THREE MINUTE POST TECH DATA HEART RATE: 103 BPM
BH CV THREE MINUTE POST TECH DATA OXYGEN SATURATION: 97 %
BH CV XLRA - TDI S': 17.3 CM/SEC
IVRT: 61 MS
LEFT ATRIUM VOLUME INDEX: 17.8 ML/M2
LV EF NUC BP: 81 %
MAXIMAL PREDICTED HEART RATE: 143 BPM
PERCENT MAX PREDICTED HR: 86.71 %
STRESS BASELINE BP: NORMAL MMHG
STRESS BASELINE HR: 71 BPM
STRESS O2 SAT REST: 95 %
STRESS PERCENT HR: 102 %
STRESS POST ESTIMATED WORKLOAD: 4.5 METS
STRESS POST EXERCISE DUR MIN: 8 MIN
STRESS POST EXERCISE DUR SEC: 0 SEC
STRESS POST O2 SAT PEAK: 91 %
STRESS POST PEAK BP: NORMAL MMHG
STRESS POST PEAK HR: 124 BPM
STRESS TARGET HR: 122 BPM

## 2024-12-12 NOTE — TELEPHONE ENCOUNTER
----- Message from Kevin Avila sent at 12/12/2024  8:22 AM EST -----  Echocardiogram showed normal heart function.  There are no major valvular problems.    Stress test : Fair exercise tolerance noted.  There were no EKG changes with exercise.  Blood supply appears to be normal to all the walls of the heart, indicating that there are no major blockages in the heart arteries.    Both tests came back as normal.  No additional cardiac testing needed at this time.  Need a follow-up over the next 1 to 2-months because of risk factors and reevaluation.  Okay to use new patient's slot if needed      Electronically signed by Kevin Avila MD, 12/12/24, 8:22 AM EST.

## 2024-12-12 NOTE — PROGRESS NOTES
Echocardiogram showed normal heart function.  There are no major valvular problems.    Stress test : Fair exercise tolerance noted.  There were no EKG changes with exercise.  Blood supply appears to be normal to all the walls of the heart, indicating that there are no major blockages in the heart arteries.    Both tests came back as normal.  No additional cardiac testing needed at this time.  Need a follow-up over the next 1 to 2-months because of risk factors and reevaluation.  Okay to use new patient's slot if needed      Electronically signed by Kevin Avila MD, 12/12/24, 8:22 AM EST.

## 2024-12-12 NOTE — TELEPHONE ENCOUNTER
CELENA patient, went over result and recommendation for f/u. Patient verbalized understanding and appreciation. Patient schedule for 2 month f/u.

## 2025-02-10 ENCOUNTER — OFFICE VISIT (OUTPATIENT)
Dept: CARDIOLOGY | Facility: CLINIC | Age: 80
End: 2025-02-10
Payer: MEDICARE

## 2025-02-10 VITALS
BODY MASS INDEX: 27.64 KG/M2 | SYSTOLIC BLOOD PRESSURE: 136 MMHG | HEART RATE: 73 BPM | DIASTOLIC BLOOD PRESSURE: 83 MMHG | HEIGHT: 63 IN | WEIGHT: 156 LBS

## 2025-02-10 DIAGNOSIS — R07.89 CHEST DISCOMFORT: Primary | ICD-10-CM

## 2025-02-10 PROCEDURE — 3079F DIAST BP 80-89 MM HG: CPT | Performed by: INTERNAL MEDICINE

## 2025-02-10 PROCEDURE — 1160F RVW MEDS BY RX/DR IN RCRD: CPT | Performed by: INTERNAL MEDICINE

## 2025-02-10 PROCEDURE — 1159F MED LIST DOCD IN RCRD: CPT | Performed by: INTERNAL MEDICINE

## 2025-02-10 PROCEDURE — 99213 OFFICE O/P EST LOW 20 MIN: CPT | Performed by: INTERNAL MEDICINE

## 2025-02-10 PROCEDURE — 3075F SYST BP GE 130 - 139MM HG: CPT | Performed by: INTERNAL MEDICINE

## 2025-02-10 NOTE — LETTER
February 16, 2025     RENA Toribio  [address]  [address]  [place] [address]    Patient: Genna Cain   YOB: 1945   Date of Visit: 2/10/2025       Dear RENA Toribio    Genna Cain was in my office today. Below is a copy of my note.    If you have questions, please do not hesitate to call me. I look forward to following Genna along with you.         Sincerely,        Kevin Avila MD        CC: No Recipients      CARDIOLOGY FOLLOW-UP PROGRESS NOTE        Chief Complaint  Follow-up and Chest Pain    Subjective           Genna Cain presents to [facility] CARDIOLOGY  History of Present Illness    Ms. Cain is here for a follow-up visit.  She was seen in early November because of an episode of chest pressure lasting for 5 minutes.  Other symptoms include intermittent palpitations.  Since last visit, she had an echocardiogram, stress testing and Holter monitor study.  All cardiac test came back as normal.    Today, patient reports having 3 more similar episodes during the past 3 months.  Episodes are described as a bandlike sensation in the lower chest, with some pain, making it hard to breathe.  Usually they last for few minutes and subsides by itself.  Palpitations are better.  She also has back pain.      Past History:    Medical History:  Past Medical History:   Diagnosis Date   • Ankle pain, right    • Hypertension    • Muscle spasm of back    • Osteoporosis    • Post-traumatic osteoarthritis of right ankle    • Posterior tibial tendinitis of right lower extremity    • Thyroid disease        Social History: reports that she has quit smoking. Her smoking use included cigarettes. She has never used smokeless tobacco. She reports current alcohol use. She reports that she does not use drugs.    Allergies: Codeine and Sulfa antibiotics    Current Outpatient Medications on File Prior to Visit   Medication Sig   • amLODIPine (NORVASC) 5 MG tablet Take 1 tablet by  "mouth Daily.   • baclofen (LIORESAL) 10 MG tablet Take 1 tablet by mouth 3 (Three) Times a Day As Needed.   • brimonidine (ALPHAGAN) 0.2 % ophthalmic solution    • CALCIUM PO Take 600 mg by mouth Daily.   • cetirizine (zyrTEC) 10 MG tablet Take 1 tablet by mouth Daily.   • levothyroxine (SYNTHROID, LEVOTHROID) 50 MCG tablet Take 1 tablet by mouth Daily.   • ramipril (ALTACE) 10 MG capsule Take 1 capsule by mouth Daily.   • vitamin D (ERGOCALCIFEROL) 1.25 MG (89947 UT) capsule capsule Take 1 capsule by mouth Every 7 (Seven) Days.     No current facility-administered medications on file prior to visit.          Review of Systems   Respiratory:  Negative for cough, shortness of breath and wheezing.    Cardiovascular:  Positive for chest pain. Negative for palpitations and leg swelling.   Gastrointestinal:  Negative for nausea and vomiting.   Neurological:  Negative for dizziness and syncope.        Objective    /83   Pulse 73   Ht 160 cm (63\")   Wt 70.8 kg (156 lb)   BMI 27.63 kg/m²       Physical Exam    General : Alert, awake, no acute distress  Neck : Supple, no carotid bruit, no jugular venous distention  CVS : Regular rate and rhythm, no murmur, rubs or gallops  Lungs: Clear to auscultation bilaterally, no crackles or rhonchi  Abdomen: Soft, nontender, bowel sounds heard in all 4 quadrants  Extremities: Warm, well-perfused, no pedal edema    Result Review:     The following data was reviewed by: Kevin Avila MD on 02/10/2025:    CMP          10/28/2024    11:55   CMP   Glucose 99    BUN 16    Creatinine 0.73    EGFR 84.8    Sodium 139    Potassium 4.1    Chloride 101    Calcium 9.3    Total Protein 7.7    Albumin 4.1    Globulin 3.6    Total Bilirubin 0.4    Alkaline Phosphatase 78    AST (SGOT) 17    ALT (SGPT) 12    Albumin/Globulin Ratio 1.1    BUN/Creatinine Ratio 21.9    Anion Gap 11.8      CBC          8/20/2024    09:34 10/28/2024    11:55   CBC   WBC 7.52     6.97    RBC 4.36     4.45  "   Hemoglobin 13.5     13.4    Hematocrit 41.5     41.6    MCV 95.2     93.5    MCH 31.0     30.1    MCHC 32.5     32.2    RDW 12.1     12.7    Platelets 319     361          Data reviewed: Cardiology studies        Results for orders placed during the hospital encounter of 12/11/24    Adult Transthoracic Echo Complete W/ Cont if Necessary Per Protocol    Interpretation Summary  •  Left ventricular systolic function is normal. Left ventricular ejection fraction appears to be 61 - 65%.  •  Left ventricular diastolic function is consistent with (grade I) impaired relaxation.  •  There are no significant valvular abnormalities.      Results for orders placed during the hospital encounter of 12/11/24    Stress Test With Myocardial Perfusion One Day    Interpretation Summary  •  Myocardial perfusion imaging indicates a normal myocardial perfusion study with no evidence of ischemia.  •  Left ventricular ejection fraction is normal.  •  Fair exercise tolerance noted.  Maximal workload achieved was 4.5 METS.  •  There was no chest pain at maximal exercise.  Findings consistent with a normal ECG stress test.  •  Impressions are consistent with a low risk study.    24-hour Holter monitor study done on 11/7/2024 showed    •  A relatively benign 24-hour Holter monitor study.  •  Underlying rhythm is normal sinus rhythm with an average heart rate of 65 bpm.  41% of the time spent in sinus bradycardia.  •  Infrequent (476 in total) isolated premature ventricular complexes noted.  There were 2 premature atrial complexes.  •  There were no arrhythmias or long pauses.             Assessment and Plan        Diagnoses and all orders for this visit:    1. Chest discomfort (Primary)  Assessment & Plan:  Intermittent episodes of chest discomfort for the past 3 months.  Each episode is described as a pressure and a bandlike sensation in the lower chest, making it hard to breathe.  No associated diaphoresis or dizziness.  Sometimes feels  hand numbness.  Subsides by itself.    Cardiac workup including echocardiogram, nuclear stress test were unremarkable.  Suspect a noncardiac etiology for the symptoms, especially thoracic spine problems causing referred pain.     As of now, we will hold off on doing invasive cardiac evaluation.  Will give a follow-up over the next 4 to 6 months for reevaluation.  In the meantime, patient advised to call us back for any recurrent or worsening symptoms, especially chest pain with exertion.                Follow Up     Return in about 6 months (around 8/10/2025) for Next scheduled follow up.    Patient was given instructions and counseling regarding her condition or for health maintenance advice. Please see specific information pulled into the AVS if appropriate.

## 2025-02-16 PROBLEM — R07.89 CHEST DISCOMFORT: Status: ACTIVE | Noted: 2024-11-09

## 2025-02-16 NOTE — ASSESSMENT & PLAN NOTE
Intermittent episodes of chest discomfort for the past 3 months.  Each episode is described as a pressure and a bandlike sensation in the lower chest, making it hard to breathe.  No associated diaphoresis or dizziness.  Sometimes feels hand numbness.  Subsides by itself.    Cardiac workup including echocardiogram, nuclear stress test were unremarkable.  Suspect a noncardiac etiology for the symptoms, especially thoracic spine problems causing referred pain.     As of now, we will hold off on doing invasive cardiac evaluation.  Will give a follow-up over the next 4 to 6 months for reevaluation.  In the meantime, patient advised to call us back for any recurrent or worsening symptoms, especially chest pain with exertion.

## 2025-02-16 NOTE — PROGRESS NOTES
CARDIOLOGY FOLLOW-UP PROGRESS NOTE        Chief Complaint  Follow-up and Chest Pain    Subjective            Genna Cain presents to [facility] CARDIOLOGY  History of Present Illness    Ms. Cain is here for a follow-up visit.  She was seen in early November because of an episode of chest pressure lasting for 5 minutes.  Other symptoms include intermittent palpitations.  Since last visit, she had an echocardiogram, stress testing and Holter monitor study.  All cardiac test came back as normal.    Today, patient reports having 3 more similar episodes during the past 3 months.  Episodes are described as a bandlike sensation in the lower chest, with some pain, making it hard to breathe.  Usually they last for few minutes and subsides by itself.  Palpitations are better.  She also has back pain.      Past History:    Medical History:  Past Medical History:   Diagnosis Date    Ankle pain, right     Hypertension     Muscle spasm of back     Osteoporosis     Post-traumatic osteoarthritis of right ankle     Posterior tibial tendinitis of right lower extremity     Thyroid disease        Social History: reports that she has quit smoking. Her smoking use included cigarettes. She has never used smokeless tobacco. She reports current alcohol use. She reports that she does not use drugs.    Allergies: Codeine and Sulfa antibiotics    Current Outpatient Medications on File Prior to Visit   Medication Sig    amLODIPine (NORVASC) 5 MG tablet Take 1 tablet by mouth Daily.    baclofen (LIORESAL) 10 MG tablet Take 1 tablet by mouth 3 (Three) Times a Day As Needed.    brimonidine (ALPHAGAN) 0.2 % ophthalmic solution     CALCIUM PO Take 600 mg by mouth Daily.    cetirizine (zyrTEC) 10 MG tablet Take 1 tablet by mouth Daily.    levothyroxine (SYNTHROID, LEVOTHROID) 50 MCG tablet Take 1 tablet by mouth Daily.    ramipril (ALTACE) 10 MG capsule Take 1 capsule by mouth Daily.    vitamin D (ERGOCALCIFEROL) 1.25 MG (53729  "UT) capsule capsule Take 1 capsule by mouth Every 7 (Seven) Days.     No current facility-administered medications on file prior to visit.          Review of Systems   Respiratory:  Negative for cough, shortness of breath and wheezing.    Cardiovascular:  Positive for chest pain. Negative for palpitations and leg swelling.   Gastrointestinal:  Negative for nausea and vomiting.   Neurological:  Negative for dizziness and syncope.        Objective     /83   Pulse 73   Ht 160 cm (63\")   Wt 70.8 kg (156 lb)   BMI 27.63 kg/m²       Physical Exam    General : Alert, awake, no acute distress  Neck : Supple, no carotid bruit, no jugular venous distention  CVS : Regular rate and rhythm, no murmur, rubs or gallops  Lungs: Clear to auscultation bilaterally, no crackles or rhonchi  Abdomen: Soft, nontender, bowel sounds heard in all 4 quadrants  Extremities: Warm, well-perfused, no pedal edema    Result Review :     The following data was reviewed by: Kevin Avila MD on 02/10/2025:    CMP          10/28/2024    11:55   CMP   Glucose 99    BUN 16    Creatinine 0.73    EGFR 84.8    Sodium 139    Potassium 4.1    Chloride 101    Calcium 9.3    Total Protein 7.7    Albumin 4.1    Globulin 3.6    Total Bilirubin 0.4    Alkaline Phosphatase 78    AST (SGOT) 17    ALT (SGPT) 12    Albumin/Globulin Ratio 1.1    BUN/Creatinine Ratio 21.9    Anion Gap 11.8      CBC          8/20/2024    09:34 10/28/2024    11:55   CBC   WBC 7.52     6.97    RBC 4.36     4.45    Hemoglobin 13.5     13.4    Hematocrit 41.5     41.6    MCV 95.2     93.5    MCH 31.0     30.1    MCHC 32.5     32.2    RDW 12.1     12.7    Platelets 319     361          Data reviewed: Cardiology studies        Results for orders placed during the hospital encounter of 12/11/24    Adult Transthoracic Echo Complete W/ Cont if Necessary Per Protocol    Interpretation Summary    Left ventricular systolic function is normal. Left ventricular ejection fraction appears " to be 61 - 65%.    Left ventricular diastolic function is consistent with (grade I) impaired relaxation.    There are no significant valvular abnormalities.      Results for orders placed during the hospital encounter of 12/11/24    Stress Test With Myocardial Perfusion One Day    Interpretation Summary    Myocardial perfusion imaging indicates a normal myocardial perfusion study with no evidence of ischemia.    Left ventricular ejection fraction is normal.    Fair exercise tolerance noted.  Maximal workload achieved was 4.5 METS.    There was no chest pain at maximal exercise.  Findings consistent with a normal ECG stress test.    Impressions are consistent with a low risk study.    24-hour Holter monitor study done on 11/7/2024 showed      A relatively benign 24-hour Holter monitor study.    Underlying rhythm is normal sinus rhythm with an average heart rate of 65 bpm.  41% of the time spent in sinus bradycardia.    Infrequent (476 in total) isolated premature ventricular complexes noted.  There were 2 premature atrial complexes.    There were no arrhythmias or long pauses.             Assessment and Plan        Diagnoses and all orders for this visit:    1. Chest discomfort (Primary)  Assessment & Plan:  Intermittent episodes of chest discomfort for the past 3 months.  Each episode is described as a pressure and a bandlike sensation in the lower chest, making it hard to breathe.  No associated diaphoresis or dizziness.  Sometimes feels hand numbness.  Subsides by itself.    Cardiac workup including echocardiogram, nuclear stress test were unremarkable.  Suspect a noncardiac etiology for the symptoms, especially thoracic spine problems causing referred pain.     As of now, we will hold off on doing invasive cardiac evaluation.  Will give a follow-up over the next 4 to 6 months for reevaluation.  In the meantime, patient advised to call us back for any recurrent or worsening symptoms, especially chest pain with  exertion.                Follow Up     Return in about 6 months (around 8/10/2025) for Next scheduled follow up.    Patient was given instructions and counseling regarding her condition or for health maintenance advice. Please see specific information pulled into the AVS if appropriate.

## 2025-05-15 ENCOUNTER — HOSPITAL ENCOUNTER (EMERGENCY)
Facility: HOSPITAL | Age: 80
Discharge: HOME | End: 2025-05-15
Attending: EMERGENCY MEDICINE
Payer: MEDICARE

## 2025-05-15 ENCOUNTER — APPOINTMENT (OUTPATIENT)
Dept: CT IMAGING | Facility: HOSPITAL | Age: 80
End: 2025-05-15
Payer: MEDICARE

## 2025-05-15 ENCOUNTER — APPOINTMENT (OUTPATIENT)
Dept: GENERAL RADIOLOGY | Facility: HOSPITAL | Age: 80
End: 2025-05-15
Payer: MEDICARE

## 2025-05-15 VITALS
HEIGHT: 63 IN | TEMPERATURE: 98.7 F | RESPIRATION RATE: 22 BRPM | OXYGEN SATURATION: 96 % | HEART RATE: 89 BPM | WEIGHT: 155.87 LBS | BODY MASS INDEX: 27.62 KG/M2 | SYSTOLIC BLOOD PRESSURE: 119 MMHG | DIASTOLIC BLOOD PRESSURE: 84 MMHG

## 2025-05-15 DIAGNOSIS — R06.00 DYSPNEA, UNSPECIFIED TYPE: Primary | ICD-10-CM

## 2025-05-15 LAB
ALBUMIN SERPL-MCNC: 4.1 G/DL (ref 3.5–5.2)
ALBUMIN/GLOB SERPL: 1.1 G/DL
ALP SERPL-CCNC: 81 U/L (ref 39–117)
ALT SERPL W P-5'-P-CCNC: 10 U/L (ref 1–33)
ANION GAP SERPL CALCULATED.3IONS-SCNC: 13.9 MMOL/L (ref 5–15)
AST SERPL-CCNC: 13 U/L (ref 1–32)
BASOPHILS # BLD AUTO: 0.06 10*3/MM3 (ref 0–0.2)
BASOPHILS NFR BLD AUTO: 0.4 % (ref 0–1.5)
BILIRUB SERPL-MCNC: 0.4 MG/DL (ref 0–1.2)
BUN SERPL-MCNC: 15 MG/DL (ref 8–23)
BUN/CREAT SERPL: 21.4 (ref 7–25)
CALCIUM SPEC-SCNC: 9.3 MG/DL (ref 8.6–10.5)
CHLORIDE SERPL-SCNC: 99 MMOL/L (ref 98–107)
CO2 SERPL-SCNC: 25.1 MMOL/L (ref 22–29)
CREAT SERPL-MCNC: 0.7 MG/DL (ref 0.57–1)
DEPRECATED RDW RBC AUTO: 44 FL (ref 37–54)
EGFRCR SERPLBLD CKD-EPI 2021: 89.2 ML/MIN/1.73
EOSINOPHIL # BLD AUTO: 0.08 10*3/MM3 (ref 0–0.4)
EOSINOPHIL NFR BLD AUTO: 0.6 % (ref 0.3–6.2)
ERYTHROCYTE [DISTWIDTH] IN BLOOD BY AUTOMATED COUNT: 12.8 % (ref 12.3–15.4)
FLUAV RNA RESP QL NAA+PROBE: NOT DETECTED
FLUBV RNA RESP QL NAA+PROBE: NOT DETECTED
GEN 5 1HR TROPONIN T REFLEX: <6 NG/L
GLOBULIN UR ELPH-MCNC: 3.9 GM/DL
GLUCOSE SERPL-MCNC: 89 MG/DL (ref 65–99)
HCT VFR BLD AUTO: 42.5 % (ref 34–46.6)
HGB BLD-MCNC: 13.6 G/DL (ref 12–15.9)
HOLD SPECIMEN: NORMAL
HOLD SPECIMEN: NORMAL
IMM GRANULOCYTES # BLD AUTO: 0.06 10*3/MM3 (ref 0–0.05)
IMM GRANULOCYTES NFR BLD AUTO: 0.4 % (ref 0–0.5)
LYMPHOCYTES # BLD AUTO: 2.43 10*3/MM3 (ref 0.7–3.1)
LYMPHOCYTES NFR BLD AUTO: 17.2 % (ref 19.6–45.3)
MCH RBC QN AUTO: 30 PG (ref 26.6–33)
MCHC RBC AUTO-ENTMCNC: 32 G/DL (ref 31.5–35.7)
MCV RBC AUTO: 93.8 FL (ref 79–97)
MONOCYTES # BLD AUTO: 0.81 10*3/MM3 (ref 0.1–0.9)
MONOCYTES NFR BLD AUTO: 5.7 % (ref 5–12)
NEUTROPHILS NFR BLD AUTO: 10.67 10*3/MM3 (ref 1.7–7)
NEUTROPHILS NFR BLD AUTO: 75.7 % (ref 42.7–76)
NRBC BLD AUTO-RTO: 0 /100 WBC (ref 0–0.2)
NT-PROBNP SERPL-MCNC: 93.2 PG/ML (ref 0–1800)
PLATELET # BLD AUTO: 421 10*3/MM3 (ref 140–450)
PMV BLD AUTO: 9.9 FL (ref 6–12)
POTASSIUM SERPL-SCNC: 4.4 MMOL/L (ref 3.5–5.2)
PROT SERPL-MCNC: 8 G/DL (ref 6–8.5)
RBC # BLD AUTO: 4.53 10*6/MM3 (ref 3.77–5.28)
RSV RNA RESP QL NAA+PROBE: NOT DETECTED
SARS-COV-2 RNA RESP QL NAA+PROBE: NOT DETECTED
SODIUM SERPL-SCNC: 138 MMOL/L (ref 136–145)
TROPONIN T NUMERIC DELTA: NORMAL
TROPONIN T SERPL HS-MCNC: <6 NG/L
WBC NRBC COR # BLD AUTO: 14.11 10*3/MM3 (ref 3.4–10.8)
WHOLE BLOOD HOLD COAG: NORMAL
WHOLE BLOOD HOLD SPECIMEN: NORMAL

## 2025-05-15 PROCEDURE — 93005 ELECTROCARDIOGRAM TRACING: CPT

## 2025-05-15 PROCEDURE — 25510000001 IOPAMIDOL PER 1 ML: Performed by: EMERGENCY MEDICINE

## 2025-05-15 PROCEDURE — 85025 COMPLETE CBC W/AUTO DIFF WBC: CPT

## 2025-05-15 PROCEDURE — 87637 SARSCOV2&INF A&B&RSV AMP PRB: CPT

## 2025-05-15 PROCEDURE — 99285 EMERGENCY DEPT VISIT HI MDM: CPT

## 2025-05-15 PROCEDURE — 36415 COLL VENOUS BLD VENIPUNCTURE: CPT

## 2025-05-15 PROCEDURE — 84484 ASSAY OF TROPONIN QUANT: CPT

## 2025-05-15 PROCEDURE — 80053 COMPREHEN METABOLIC PANEL: CPT

## 2025-05-15 PROCEDURE — 71275 CT ANGIOGRAPHY CHEST: CPT

## 2025-05-15 PROCEDURE — 71045 X-RAY EXAM CHEST 1 VIEW: CPT

## 2025-05-15 PROCEDURE — 94799 UNLISTED PULMONARY SVC/PX: CPT

## 2025-05-15 PROCEDURE — 94640 AIRWAY INHALATION TREATMENT: CPT

## 2025-05-15 PROCEDURE — 93005 ELECTROCARDIOGRAM TRACING: CPT | Performed by: EMERGENCY MEDICINE

## 2025-05-15 PROCEDURE — 83880 ASSAY OF NATRIURETIC PEPTIDE: CPT

## 2025-05-15 PROCEDURE — 84484 ASSAY OF TROPONIN QUANT: CPT | Performed by: EMERGENCY MEDICINE

## 2025-05-15 RX ORDER — ALBUTEROL SULFATE 0.83 MG/ML
2.5 SOLUTION RESPIRATORY (INHALATION) CONTINUOUS
Status: DISCONTINUED | OUTPATIENT
Start: 2025-05-15 | End: 2025-05-15 | Stop reason: HOSPADM

## 2025-05-15 RX ORDER — ALBUTEROL SULFATE 90 UG/1
2 INHALANT RESPIRATORY (INHALATION) EVERY 4 HOURS PRN
Qty: 90 G | Refills: 0 | Status: SHIPPED | OUTPATIENT
Start: 2025-05-15

## 2025-05-15 RX ORDER — SODIUM CHLORIDE 0.9 % (FLUSH) 0.9 %
10 SYRINGE (ML) INJECTION AS NEEDED
Status: DISCONTINUED | OUTPATIENT
Start: 2025-05-15 | End: 2025-05-15 | Stop reason: HOSPADM

## 2025-05-15 RX ORDER — IOPAMIDOL 755 MG/ML
100 INJECTION, SOLUTION INTRAVASCULAR
Status: COMPLETED | OUTPATIENT
Start: 2025-05-15 | End: 2025-05-15

## 2025-05-15 RX ORDER — ALBUTEROL SULFATE 90 UG/1
2 INHALANT RESPIRATORY (INHALATION) EVERY 4 HOURS PRN
Qty: 90 G | Refills: 0 | Status: SHIPPED | OUTPATIENT
Start: 2025-05-15 | End: 2025-05-15

## 2025-05-15 RX ADMIN — ALBUTEROL SULFATE 2.5 MG: 2.5 SOLUTION RESPIRATORY (INHALATION) at 18:02

## 2025-05-15 RX ADMIN — ALBUTEROL SULFATE 2.5 MG: 2.5 SOLUTION RESPIRATORY (INHALATION) at 18:01

## 2025-05-15 RX ADMIN — IOPAMIDOL 95 ML: 755 INJECTION, SOLUTION INTRAVENOUS at 18:50

## 2025-05-15 NOTE — ED PROVIDER NOTES
Time: 5:54 PM EDT  Date of encounter:  5/15/2025  Independent Historian/Clinical History and Information was obtained by:   Patient    History is limited by: N/A    Chief Complaint: Shortness of breath      History of Present Illness:  Patient is a 79 y.o. year old female who presents to the emergency department for evaluation of shortness of breath is gotten worse.  The patient reports that 2 weeks ago she was treated for bronchitis.  Patient states that she finished a Z-Eliceo and steroids this past Monday.  Patient denies nausea, vomiting, and diarrhea.  Patient does report chest tightness.  Patient has no leg pain or swelling.  Patient denies fever and chills.      Patient Care Team  Primary Care Provider: Bella Weinstein APRN    Past Medical History:     Allergies   Allergen Reactions    Codeine Unknown - High Severity    Sulfa Antibiotics Unknown - Low Severity     Past Medical History:   Diagnosis Date    Ankle pain, right     Hypertension     Muscle spasm of back     Osteoporosis     Post-traumatic osteoarthritis of right ankle     Posterior tibial tendinitis of right lower extremity     Thyroid disease      Past Surgical History:   Procedure Laterality Date    ANKLE OPEN REDUCTION INTERNAL FIXATION      CHOLECYSTECTOMY      HYSTERECTOMY      US GUIDED CYST ASPIRATION BREAST N/A 08/09/2021     Family History   Family history unknown: Yes       Home Medications:  Prior to Admission medications    Medication Sig Start Date End Date Taking? Authorizing Provider   amLODIPine (NORVASC) 5 MG tablet Take 1 tablet by mouth Daily. 8/25/23   Veronica Arzate MD   baclofen (LIORESAL) 10 MG tablet Take 1 tablet by mouth 3 (Three) Times a Day As Needed. 8/25/23   Veronica Arzate MD   brimonidine (ALPHAGAN) 0.2 % ophthalmic solution  9/18/23   Veronica Arzate MD   CALCIUM PO Take 600 mg by mouth Daily. 8/25/23   Veronica Arzate MD   cetirizine (zyrTEC) 10 MG tablet Take 1 tablet by mouth Daily.  "8/25/23   Veronica Arzate MD   levothyroxine (SYNTHROID, LEVOTHROID) 50 MCG tablet Take 1 tablet by mouth Daily. 8/25/23   Veronica Arzate MD   ramipril (ALTACE) 10 MG capsule Take 1 capsule by mouth Daily. 8/25/23   Veronica Arzate MD   vitamin D (ERGOCALCIFEROL) 1.25 MG (71374 UT) capsule capsule Take 1 capsule by mouth Every 7 (Seven) Days. 8/25/23   Veronica Arzate MD        Social History:   Social History     Tobacco Use    Smoking status: Former     Types: Cigarettes    Smokeless tobacco: Never   Vaping Use    Vaping status: Never Used   Substance Use Topics    Alcohol use: Yes     Comment: once a month    Drug use: Never         Review of Systems:  Review of Systems   Constitutional:  Negative for chills and fever.   HENT:  Negative for congestion, rhinorrhea and sore throat.    Eyes:  Negative for pain and visual disturbance.   Respiratory:  Positive for cough and shortness of breath. Negative for apnea and chest tightness.    Cardiovascular:  Negative for chest pain and palpitations.   Gastrointestinal:  Negative for abdominal pain, diarrhea, nausea and vomiting.   Genitourinary:  Negative for difficulty urinating and dysuria.   Musculoskeletal:  Negative for joint swelling and myalgias.   Skin:  Negative for color change.   Neurological:  Negative for seizures and headaches.   Psychiatric/Behavioral: Negative.     All other systems reviewed and are negative.       Physical Exam:  /66   Pulse 93   Temp 99.3 °F (37.4 °C) (Oral)   Resp 20   Ht 160 cm (63\")   Wt 70.7 kg (155 lb 13.8 oz)   SpO2 95%   BMI 27.61 kg/m²     Physical Exam  Vitals and nursing note reviewed.   Constitutional:       General: She is not in acute distress.     Appearance: Normal appearance. She is not toxic-appearing.   HENT:      Head: Normocephalic and atraumatic.      Jaw: There is normal jaw occlusion.   Eyes:      General: Lids are normal.      Extraocular Movements: Extraocular movements " intact.      Conjunctiva/sclera: Conjunctivae normal.      Pupils: Pupils are equal, round, and reactive to light.   Cardiovascular:      Rate and Rhythm: Normal rate and regular rhythm.      Pulses: Normal pulses.      Heart sounds: Normal heart sounds.   Pulmonary:      Effort: Pulmonary effort is normal. No respiratory distress.      Breath sounds: Wheezing present. No rhonchi.   Abdominal:      General: Abdomen is flat.      Palpations: Abdomen is soft.      Tenderness: There is no abdominal tenderness. There is no guarding or rebound.   Musculoskeletal:         General: Normal range of motion.      Cervical back: Normal range of motion and neck supple.      Right lower leg: No edema.      Left lower leg: No edema.   Skin:     General: Skin is warm and dry.   Neurological:      Mental Status: She is alert and oriented to person, place, and time. Mental status is at baseline.   Psychiatric:         Mood and Affect: Mood normal.                    Medical Decision Making:      Comorbidities that affect care:    Hypertension    External Notes reviewed:    Previous Clinic Note: Patient was last seen in clinic for sinus issues.      The following orders were placed and all results were independently analyzed by me:  Orders Placed This Encounter   Procedures    COVID PRE-OP / PRE-PROCEDURE SCREENING ORDER (NO ISOLATION) - Swab, Nasopharynx    COVID-19, FLU A/B, RSV PCR 1 HR TAT - Swab, Nasopharynx    XR Chest 1 View    CT Angiogram Chest Pulmonary Embolism    [facility] Draw    Comprehensive Metabolic Panel    BNP    High Sensitivity Troponin T    CBC Auto Differential    High Sensitivity Troponin T 1Hr    NPO Diet NPO Type: Strict NPO    Undress & Gown    Continuous Pulse Oximetry    Vital Signs    Oxygen Therapy- Nasal Cannula; Titrate 1-6 LPM Per SpO2; 90 - 95%    ECG 12 Lead ED Triage Standing Order; SOA    Insert Peripheral IV    CBC & Differential    Green Top (Gel)    Lavender Top    Gold Top - SST    Light Blue  Top       Medications Given in the Emergency Department:  Medications   sodium chloride 0.9 % flush 10 mL (has no administration in time range)   albuterol (PROVENTIL) nebulizer solution 0.083% 2.5 mg/3mL (2.5 mg Nebulization New Bag 5/15/25 1802)   iopamidol (ISOVUE-370) 76 % injection 100 mL (95 mL Intravenous Given 5/15/25 1850)        ED Course:         Labs:    Lab Results (last 24 hours)       Procedure Component Value Units Date/Time    COVID PRE-OP / PRE-PROCEDURE SCREENING ORDER (NO ISOLATION) - Swab, Nasopharynx [[id number]]  (Normal) Collected: 05/15/25 1628    Specimen: Swab from Nasopharynx Updated: 05/15/25 1717    Narrative:      The following orders were created for panel order COVID PRE-OP / PRE-PROCEDURE SCREENING ORDER (NO ISOLATION) - Swab, Nasopharynx.  Procedure                               Abnormality         Status                     ---------                               -----------         ------                     COVID-19, FLU A/B, RSV P...[[id number]]  Normal              Final result                 Please view results for these tests on the individual orders.    COVID-19, FLU A/B, RSV PCR 1 HR TAT - Swab, Nasopharynx [[id number]]  (Normal) Collected: 05/15/25 1628    Specimen: Swab from Nasopharynx Updated: 05/15/25 1717     COVID19 Not Detected     Influenza A PCR Not Detected     Influenza B PCR Not Detected     RSV, PCR Not Detected    Narrative:      Fact sheet for providers: https://www.fda.gov/media/613073/download    Fact sheet for patients: https://www.fda.gov/media/303411/download    Test performed by PCR.    CBC & Differential [[id number]]  (Abnormal) Collected: 05/15/25 1630    Specimen: Blood from Arm, Right Updated: 05/15/25 1638    Narrative:      The following orders were created for panel order CBC & Differential.  Procedure                               Abnormality         Status                     ---------                               -----------         ------                      CBC Auto Differential[[id number]]        Abnormal            Final result                 Please view results for these tests on the individual orders.    Comprehensive Metabolic Panel [[id number]] Collected: 05/15/25 1630    Specimen: Blood from Arm, Right Updated: 05/15/25 1705     Glucose 89 mg/dL      BUN 15 mg/dL      Creatinine 0.70 mg/dL      Sodium 138 mmol/L      Potassium 4.4 mmol/L      Chloride 99 mmol/L      CO2 25.1 mmol/L      Calcium 9.3 mg/dL      Total Protein 8.0 g/dL      Albumin 4.1 g/dL      ALT (SGPT) 10 U/L      AST (SGOT) 13 U/L      Alkaline Phosphatase 81 U/L      Total Bilirubin 0.4 mg/dL      Globulin 3.9 gm/dL      A/G Ratio 1.1 g/dL      BUN/Creatinine Ratio 21.4     Anion Gap 13.9 mmol/L      eGFR 89.2 mL/min/1.73     Narrative:      GFR Categories in Chronic Kidney Disease (CKD)              GFR Category          GFR (mL/min/1.73)    Interpretation  G1                    90 or greater        Normal or high (1)  G2                    60-89                Mild decrease (1)  G3a                   45-59                Mild to moderate decrease  G3b                   30-44                Moderate to severe decrease  G4                    15-29                Severe decrease  G5                    14 or less           Kidney failure    (1)In the absence of evidence of kidney disease, neither GFR category G1 or G2 fulfill the criteria for CKD.    eGFR calculation 2021 CKD-EPI creatinine equation, which does not include race as a factor    BNP [[id number]]  (Normal) Collected: 05/15/25 1630    Specimen: Blood from Arm, Right Updated: 05/15/25 1702     proBNP 93.2 pg/mL     Narrative:      This assay is used as an aid in the diagnosis of individuals suspected of having heart failure. It can be used as an aid in the diagnosis of acute decompensated heart failure (ADHF) in patients presenting with signs and symptoms of ADHF to the emergency department (ED). In addition, NT-proBNP  of <300 pg/mL indicates ADHF is not likely.    Age Range Result Interpretation  NT-proBNP Concentration (pg/mL:      <50             Positive            >450                   Gray                 300-450                    Negative             <300    50-75           Positive            >900                  Gray                300-900                  Negative            <300      >75             Positive            >1800                  Gray                300-1800                  Negative            <300    High Sensitivity Troponin T [[id number]]  (Normal) Collected: 05/15/25 1630    Specimen: Blood from Arm, Right Updated: 05/15/25 1705     HS Troponin T <6 ng/L     Narrative:      High Sensitive Troponin T Reference Range:  <14.0 ng/L- Negative Female for AMI  <22.0 ng/L- Negative Male for AMI  >=14 - Abnormal Female indicating possible myocardial injury.  >=22 - Abnormal Male indicating possible myocardial injury.   Clinicians would have to utilize clinical acumen, EKG, Troponin, and serial changes to determine if it is an Acute Myocardial Infarction or myocardial injury due to an underlying chronic condition.         CBC Auto Differential [[id number]]  (Abnormal) Collected: 05/15/25 1630    Specimen: Blood from Arm, Right Updated: 05/15/25 1638     WBC 14.11 10*3/mm3      RBC 4.53 10*6/mm3      Hemoglobin 13.6 g/dL      Hematocrit 42.5 %      MCV 93.8 fL      MCH 30.0 pg      MCHC 32.0 g/dL      RDW 12.8 %      RDW-SD 44.0 fl      MPV 9.9 fL      Platelets 421 10*3/mm3      Neutrophil % 75.7 %      Lymphocyte % 17.2 %      Monocyte % 5.7 %      Eosinophil % 0.6 %      Basophil % 0.4 %      Immature Grans % 0.4 %      Neutrophils, Absolute 10.67 10*3/mm3      Lymphocytes, Absolute 2.43 10*3/mm3      Monocytes, Absolute 0.81 10*3/mm3      Eosinophils, Absolute 0.08 10*3/mm3      Basophils, Absolute 0.06 10*3/mm3      Immature Grans, Absolute 0.06 10*3/mm3      nRBC 0.0 /100 WBC     High Sensitivity Troponin T  1Hr [[id number]] Collected: 05/15/25 1853    Specimen: Blood from Arm, Left Updated: 05/15/25 1919     HS Troponin T <6 ng/L      Troponin T Numeric Delta --     Comment: Unable to calculate.       Narrative:      High Sensitive Troponin T Reference Range:  <14.0 ng/L- Negative Female for AMI  <22.0 ng/L- Negative Male for AMI  >=14 - Abnormal Female indicating possible myocardial injury.  >=22 - Abnormal Male indicating possible myocardial injury.   Clinicians would have to utilize clinical acumen, EKG, Troponin, and serial changes to determine if it is an Acute Myocardial Infarction or myocardial injury due to an underlying chronic condition.                  Imaging:    CT Angiogram Chest Pulmonary Embolism  Result Date: 5/15/2025  CT ANGIOGRAM CHEST PULMONARY EMBOLISM Date of Exam: 5/15/2025 6:37 PM EDT Indication: shortness of breath. Comparison: 10/28/2024 Technique: Axial CT images were obtained of the chest after the uneventful intravenous administration of iodinated contrast utilizing pulmonary embolism protocol.  Reconstructed coronal and sagittal images were also obtained. In addition, a 3-D volume rendered image was created for interpretation.  Automated exposure control and iterative construction methods were used. Findings: 0.7 cm groundglass right upper lobe nodule on image #14 series 7. 0.3 cm x 0.6 cm superior segment right lower lobe nodule on image #28 series 7. Pleural-based 0.5 cm right middle lobe nodule on image #37 series 7. The left lung is clear. The thyroid gland is atrophic. The subglottic airway is clear. Atheromatous disease of the aortic arch. No evidence of aortic dissection. No pulmonary embolus. Calcified mediastinal lymph nodes consistent with prior granulomatous disease. Atheromatous disease of the coronary vessels. No pericardial effusion. Small hiatal hernia. Bilateral peripelvic renal cysts. Prior cholecystectomy. Duodenal diverticulum. Colonic diverticulosis without evidence of  diverticulitis. Severe narrowing of the celiac axis with poststenotic dilatation. The celiac axis demonstrates a normal length of 0.2 cm with poststenotic dilatation up to 0.8 cm. The superior mesenteric artery remains patent. The renal arteries are patent. Thoracic vertebral body height and alignment are normal. The sternum is intact. No rib fractures.     1. Stable right pulmonary nodules. No follow-up indicated. No acute cardiopulmonary disease. 2. Possible median arcuate ligament syndrome. Electronically Signed: Kenton Aguilar MD  5/15/2025 7:16 PM EDT  Workstation ID: [id number]    XR Chest 1 View  Result Date: 5/15/2025  XR CHEST 1 VW Date of Exam: 5/15/2025 5:06 PM EDT Indication: SOA Triage Protocol Comparison: CT chest October 28, 2024 Findings: The heart is nonenlarged. There are some vague interstitial changes in the right apical area that could be reflective of some chronic change. There are no pleural effusions. There is a slight curvature to the spine.     Impression: There are some vague interstitial changes in the right apical area that could be reflective of some chronic change. Electronically Signed: Shadi Escamilla MD  5/15/2025 5:38 PM EDT  Workstation ID: [id number]        Differential Diagnosis and Discussion:    Dyspnea: Differential diagnosis includes but is not limited to metabolic acidosis, neurological disorders, psychogenic, asthma, pneumothorax, upper airway obstruction, COPD, pneumonia, noncardiogenic pulmonary edema, interstitial lung disease, anemia, congestive heart failure, and pulmonary embolism    PROCEDURES:    Labs were collected in the emergency department and all labs were reviewed and interpreted by me.  X-ray were performed in the emergency department and all X-ray impressions were independently interpreted by me.  An EKG was performed and the EKG was interpreted by me.  CT scan was performed in the emergency department and the CT scan radiology impression was interpreted by  me.    ECG 12 Lead ED Triage Standing Order; SOA   Preliminary Result   HEART RATE=88  bpm   RR Oqezjnam=461  ms   NE Lkmrzbwv=719  ms   P Horizontal Axis=2  deg   P Front Axis=41  deg   QRSD Interval=89  ms   QT Adiytucz=377  ms   OYfH=146  ms   QRS Axis=28  deg   T Wave Axis=10  deg   - NORMAL ECG -   Sinus rhythm   Date and Time of Study:2025-05-15 16:20:55          Procedures    MDM     The patient´s CBC that was reviewed and interpreted by me shows no abnormalities of critical concern. Of note, there is no anemia requiring a blood transfusion and the platelet count is acceptable.  The patient´s CMP that was reviewed and interpretted by me shows no abnormalities of critical concern. Of note, the patient´s sodium and potassium are acceptable. The patient´s liver enzymes are unremarkable. The patient´s renal function (creatinine) is preserved. The patient has a normal anion gap.  CT scan of the chest is negative for pulmonary embolism.  Troponin x 2 is negative.  Patient was given an albuterol breathing treatment in the emergency department and does report improvement of her symptoms.  The patient reports significant relief of their symptoms with ED treatment. The patient has no respiratory distress or hypoxia. This includes the absence of cervical, clavicular, and abdominal retractions; tachypnea and an oxygen saturation of less than 92% on room air.  The patient is able to speak in full sentences and is ambulatory without hypoxia on exertion. The patient's condition is stable and appropriate for discharge. The patient will be discharged with a prescription for bronchodilators and a steroid. The patient was advised to return for fever, respiratory distress, intractable vomiting, weakness, worsening shortness of breath, chest pain, or altered mental status. The []will pursue further outpatient evaluation with the primary care physician. The []has expressed a clear and thorough understanding and agreed to follow-up  as instructed.                Patient Care Considerations:    ANTIBIOTICS: I considered prescribing antibiotics as an outpatient however no bacterial focus of infection was found.      Consultants/Shared Management Plan:    None    Social Determinants of Health:    Patient is independent, reliable, and has access to care.       Disposition and Care Coordination:    Discharged: I considered escalation of care by admitting this patient to the hospital, however patient has no respiratory distress or hypoxia.    I have explained the patient´s condition, diagnoses and treatment plan based on the information available to me at this time. I have answered questions and addressed any concerns. The patient has a good  understanding of the patient´s diagnosis, condition, and treatment plan as can be expected at this point. The vital signs have been stable. The patient´s condition is stable and appropriate for discharge from the emergency department.      The patient will pursue further outpatient evaluation with the primary care physician or other designated or consulting physician as outlined in the discharge instructions. They are agreeable to this plan of care and follow-up instructions have been explained in detail. The patient has received these instructions in written format and has expressed an understanding of the discharge instructions. The patient is aware that any significant change in condition or worsening of symptoms should prompt an immediate return to this or the closest emergency department or call to 911.  I have explained discharge medications and the need for follow up with the patient/caretakers. This was also printed in the discharge instructions. Patient was discharged with the following medications and follow up:      Medication List        New Prescriptions      albuterol sulfate  (90 Base) MCG/ACT inhaler  Commonly known as: PROVENTIL HFA;VENTOLIN HFA;PROAIR HFA  Inhale 2 puffs Every 4 (Four)  Hours As Needed for Wheezing.               Where to Get Your Medications        These medications were sent to [id number] [place] [facility] [address]  AT [facility] [address] - [phone]  - [phone]  [address] , [facility] [address]      Phone: [phone]   albuterol sulfate  (90 Base) MCG/ACT inhaler      Bella Weinstein, APRN  [address]  [address]  [facility] [id number] [phone]    In 2 days         Final diagnoses:   Dyspnea, unspecified type        ED Disposition       ED Disposition   Discharge    Condition   Stable    Comment   --               This medical record created using voice recognition software.             Carlos Esposito MD  05/15/25 1832

## 2025-05-16 LAB
QT INTERVAL: 353 MS
QTC INTERVAL: 427 MS

## 2025-08-11 ENCOUNTER — OFFICE VISIT (OUTPATIENT)
Dept: CARDIOLOGY | Facility: CLINIC | Age: 80
End: 2025-08-11
Payer: MEDICARE

## 2025-08-11 VITALS
HEIGHT: 63 IN | SYSTOLIC BLOOD PRESSURE: 136 MMHG | BODY MASS INDEX: 27.64 KG/M2 | WEIGHT: 156 LBS | DIASTOLIC BLOOD PRESSURE: 81 MMHG | HEART RATE: 77 BPM

## 2025-08-11 DIAGNOSIS — I10 ESSENTIAL HYPERTENSION: ICD-10-CM

## 2025-08-11 DIAGNOSIS — R07.89 CHEST DISCOMFORT: Primary | ICD-10-CM

## 2025-08-11 PROCEDURE — 3079F DIAST BP 80-89 MM HG: CPT | Performed by: INTERNAL MEDICINE

## 2025-08-11 PROCEDURE — 3075F SYST BP GE 130 - 139MM HG: CPT | Performed by: INTERNAL MEDICINE

## 2025-08-11 PROCEDURE — 1159F MED LIST DOCD IN RCRD: CPT | Performed by: INTERNAL MEDICINE

## 2025-08-11 PROCEDURE — 1160F RVW MEDS BY RX/DR IN RCRD: CPT | Performed by: INTERNAL MEDICINE

## 2025-08-11 PROCEDURE — 99213 OFFICE O/P EST LOW 20 MIN: CPT | Performed by: INTERNAL MEDICINE

## 2025-08-11 RX ORDER — ROSUVASTATIN CALCIUM 5 MG/1
5 TABLET, COATED ORAL DAILY
COMMUNITY